# Patient Record
Sex: MALE | Race: WHITE | NOT HISPANIC OR LATINO | Employment: FULL TIME | URBAN - METROPOLITAN AREA
[De-identification: names, ages, dates, MRNs, and addresses within clinical notes are randomized per-mention and may not be internally consistent; named-entity substitution may affect disease eponyms.]

---

## 2023-01-27 PROBLEM — M54.50 LUMBAR PAIN: Status: ACTIVE | Noted: 2023-01-27

## 2023-01-27 RX ORDER — MOMETASONE FUROATE 50 UG/1
SPRAY, METERED NASAL
COMMUNITY
Start: 2016-02-03 | End: 2023-03-09 | Stop reason: WASHOUT

## 2023-01-27 RX ORDER — ATORVASTATIN CALCIUM 10 MG/1
10 TABLET, FILM COATED ORAL
COMMUNITY
Start: 2016-02-03 | End: 2023-03-09 | Stop reason: WASHOUT

## 2023-01-27 RX ORDER — FEXOFENADINE HCL 60 MG
60 TABLET ORAL
COMMUNITY
Start: 2016-02-03 | End: 2023-03-09 | Stop reason: WASHOUT

## 2023-01-27 RX ORDER — CITALOPRAM 10 MG/1
10 TABLET ORAL
COMMUNITY
Start: 2016-02-03 | End: 2023-03-09 | Stop reason: WASHOUT

## 2023-01-27 RX ORDER — OMEPRAZOLE 20 MG/1
CAPSULE, DELAYED RELEASE ORAL
COMMUNITY
Start: 2016-02-03 | End: 2023-03-09 | Stop reason: WASHOUT

## 2023-01-27 RX ORDER — MOMETASONE FUROATE AND FORMOTEROL FUMARATE DIHYDRATE 100; 5 UG/1; UG/1
AEROSOL RESPIRATORY (INHALATION)
COMMUNITY
Start: 2016-02-03 | End: 2023-03-09 | Stop reason: SDUPTHER

## 2023-01-27 RX ORDER — ALBUTEROL SULFATE 0.83 MG/ML
2.5 SOLUTION RESPIRATORY (INHALATION)
COMMUNITY
Start: 2016-02-03 | End: 2023-09-12 | Stop reason: WASHOUT

## 2023-02-11 PROBLEM — E78.00 PURE HYPERCHOLESTEROLEMIA: Status: ACTIVE | Noted: 2019-06-17

## 2023-02-11 PROBLEM — I25.10 ATHEROSCLEROSIS OF CORONARY ARTERY WITHOUT ANGINA PECTORIS: Status: ACTIVE | Noted: 2022-09-15

## 2023-02-11 PROBLEM — F52.21 PSYCHOGENIC IMPOTENCE: Status: ACTIVE | Noted: 2021-01-18

## 2023-02-11 PROBLEM — B00.1 HERPESVIRAL VESICULAR DERMATITIS: Status: ACTIVE | Noted: 2019-06-17

## 2023-02-11 PROBLEM — J30.9 ALLERGIC RHINITIS: Status: ACTIVE | Noted: 2019-06-17

## 2023-02-11 PROBLEM — G47.00 INSOMNIA: Status: ACTIVE | Noted: 2021-01-18

## 2023-02-11 PROBLEM — J45.30 MILD PERSISTENT ASTHMA (HHS-HCC): Status: ACTIVE | Noted: 2019-06-17

## 2023-02-11 PROBLEM — N52.9 ERECTILE DYSFUNCTION: Status: ACTIVE | Noted: 2021-10-05

## 2023-02-11 PROBLEM — K76.9 CHRONIC NONALCOHOLIC LIVER DISEASE: Status: ACTIVE | Noted: 2022-09-15

## 2023-02-11 RX ORDER — DOXYCYCLINE HYCLATE 100 MG
1 TABLET ORAL 2 TIMES DAILY
COMMUNITY
End: 2023-03-09 | Stop reason: WASHOUT

## 2023-02-11 RX ORDER — EZETIMIBE 10 MG/1
1 TABLET ORAL DAILY
COMMUNITY
Start: 2022-05-05

## 2023-02-11 RX ORDER — CYCLOBENZAPRINE HCL 5 MG
1 TABLET ORAL NIGHTLY PRN
COMMUNITY
Start: 2021-01-18 | End: 2023-04-27 | Stop reason: SDUPTHER

## 2023-02-11 RX ORDER — TRAZODONE HYDROCHLORIDE 100 MG/1
1 TABLET ORAL NIGHTLY PRN
COMMUNITY
Start: 2020-11-09 | End: 2023-03-09 | Stop reason: SDUPTHER

## 2023-02-11 RX ORDER — SILDENAFIL 100 MG/1
.5-1 TABLET, FILM COATED ORAL
COMMUNITY
Start: 2021-01-18 | End: 2023-03-09 | Stop reason: SDUPTHER

## 2023-02-12 RX ORDER — OMEPRAZOLE 40 MG/1
1 CAPSULE, DELAYED RELEASE ORAL DAILY
COMMUNITY
Start: 2013-08-26 | End: 2023-03-09 | Stop reason: SDUPTHER

## 2023-02-12 RX ORDER — ROSUVASTATIN CALCIUM 40 MG/1
1 TABLET, COATED ORAL NIGHTLY
COMMUNITY
Start: 2017-12-20 | End: 2023-04-27 | Stop reason: SDUPTHER

## 2023-02-12 RX ORDER — ASPIRIN 81 MG/1
1 TABLET ORAL DAILY
COMMUNITY

## 2023-02-12 RX ORDER — NYSTATIN AND TRIAMCINOLONE ACETONIDE 100000; 1 [USP'U]/G; MG/G
CREAM TOPICAL 2 TIMES DAILY
COMMUNITY
Start: 2018-12-19 | End: 2023-04-27 | Stop reason: SDUPTHER

## 2023-02-12 RX ORDER — CELECOXIB 200 MG/1
1-2 CAPSULE ORAL DAILY
COMMUNITY
Start: 2015-10-22 | End: 2023-03-09 | Stop reason: SDUPTHER

## 2023-02-12 RX ORDER — MINERAL OIL
1 ENEMA (ML) RECTAL DAILY
COMMUNITY
Start: 2017-12-20 | End: 2023-03-09 | Stop reason: WASHOUT

## 2023-02-12 RX ORDER — VALACYCLOVIR HYDROCHLORIDE 1 G/1
1 TABLET, FILM COATED ORAL 2 TIMES DAILY
COMMUNITY
Start: 2016-06-06 | End: 2023-05-22 | Stop reason: SDUPTHER

## 2023-02-12 RX ORDER — FLUTICASONE PROPIONATE 50 MCG
SPRAY, SUSPENSION (ML) NASAL NIGHTLY
COMMUNITY
End: 2023-03-09 | Stop reason: SDUPTHER

## 2023-02-12 RX ORDER — FLUOROMETHOLONE 1 MG/ML
1 SUSPENSION/ DROPS OPHTHALMIC 3 TIMES DAILY
COMMUNITY
Start: 2018-06-27

## 2023-03-09 ENCOUNTER — OFFICE VISIT (OUTPATIENT)
Dept: PRIMARY CARE | Facility: CLINIC | Age: 68
End: 2023-03-09
Payer: COMMERCIAL

## 2023-03-09 VITALS
TEMPERATURE: 98.7 F | DIASTOLIC BLOOD PRESSURE: 74 MMHG | HEART RATE: 73 BPM | WEIGHT: 258 LBS | OXYGEN SATURATION: 96 % | BODY MASS INDEX: 32.08 KG/M2 | SYSTOLIC BLOOD PRESSURE: 114 MMHG | HEIGHT: 75 IN

## 2023-03-09 DIAGNOSIS — B00.1 HERPESVIRAL VESICULAR DERMATITIS: ICD-10-CM

## 2023-03-09 DIAGNOSIS — K21.9 GASTROESOPHAGEAL REFLUX DISEASE WITHOUT ESOPHAGITIS: ICD-10-CM

## 2023-03-09 DIAGNOSIS — Z12.5 SCREENING FOR MALIGNANT NEOPLASM OF PROSTATE: Primary | ICD-10-CM

## 2023-03-09 DIAGNOSIS — M15.0 PRIMARY GENERALIZED (OSTEO)ARTHRITIS: ICD-10-CM

## 2023-03-09 DIAGNOSIS — K76.0 FATTY LIVER: ICD-10-CM

## 2023-03-09 DIAGNOSIS — J45.30 MILD PERSISTENT ASTHMA WITHOUT COMPLICATION (HHS-HCC): ICD-10-CM

## 2023-03-09 DIAGNOSIS — I25.10 ATHEROSCLEROSIS OF NATIVE CORONARY ARTERY OF NATIVE HEART WITHOUT ANGINA PECTORIS: ICD-10-CM

## 2023-03-09 DIAGNOSIS — G47.00 INSOMNIA, UNSPECIFIED TYPE: ICD-10-CM

## 2023-03-09 DIAGNOSIS — J30.2 SEASONAL ALLERGIC RHINITIS, UNSPECIFIED TRIGGER: ICD-10-CM

## 2023-03-09 DIAGNOSIS — N52.9 ERECTILE DYSFUNCTION, UNSPECIFIED ERECTILE DYSFUNCTION TYPE: ICD-10-CM

## 2023-03-09 DIAGNOSIS — E78.00 PURE HYPERCHOLESTEROLEMIA: ICD-10-CM

## 2023-03-09 PROCEDURE — 1036F TOBACCO NON-USER: CPT | Performed by: PHYSICIAN ASSISTANT

## 2023-03-09 PROCEDURE — 99214 OFFICE O/P EST MOD 30 MIN: CPT | Performed by: PHYSICIAN ASSISTANT

## 2023-03-09 PROCEDURE — 3008F BODY MASS INDEX DOCD: CPT | Performed by: PHYSICIAN ASSISTANT

## 2023-03-09 PROCEDURE — 1160F RVW MEDS BY RX/DR IN RCRD: CPT | Performed by: PHYSICIAN ASSISTANT

## 2023-03-09 PROCEDURE — 1159F MED LIST DOCD IN RCRD: CPT | Performed by: PHYSICIAN ASSISTANT

## 2023-03-09 RX ORDER — MOMETASONE FUROATE AND FORMOTEROL FUMARATE DIHYDRATE 100; 5 UG/1; UG/1
2 AEROSOL RESPIRATORY (INHALATION)
Qty: 39 G | Refills: 1 | Status: SHIPPED | OUTPATIENT
Start: 2023-03-09 | End: 2023-03-21 | Stop reason: CLARIF

## 2023-03-09 RX ORDER — SILDENAFIL 100 MG/1
50-100 TABLET, FILM COATED ORAL
Qty: 30 TABLET | Refills: 0 | Status: SHIPPED | OUTPATIENT
Start: 2023-03-09 | End: 2023-04-27 | Stop reason: SDUPTHER

## 2023-03-09 RX ORDER — OMEPRAZOLE 40 MG/1
40 CAPSULE, DELAYED RELEASE ORAL DAILY
Qty: 90 CAPSULE | Refills: 1 | Status: SHIPPED | OUTPATIENT
Start: 2023-03-09 | End: 2023-04-27 | Stop reason: SDUPTHER

## 2023-03-09 RX ORDER — FLUTICASONE PROPIONATE 50 MCG
2 SPRAY, SUSPENSION (ML) NASAL DAILY
Qty: 48 G | Refills: 1 | Status: SHIPPED | OUTPATIENT
Start: 2023-03-09 | End: 2023-04-27 | Stop reason: SDUPTHER

## 2023-03-09 RX ORDER — BUDESONIDE 0.5 MG/2ML
0.5 INHALANT ORAL
COMMUNITY
End: 2023-03-09 | Stop reason: WASHOUT

## 2023-03-09 RX ORDER — MUPIROCIN 20 MG/G
OINTMENT TOPICAL
COMMUNITY
End: 2023-09-12 | Stop reason: WASHOUT

## 2023-03-09 RX ORDER — CELECOXIB 200 MG/1
200-400 CAPSULE ORAL DAILY
Qty: 180 CAPSULE | Refills: 0 | Status: SHIPPED | OUTPATIENT
Start: 2023-03-09 | End: 2023-06-20 | Stop reason: SDUPTHER

## 2023-03-09 RX ORDER — CETIRIZINE HYDROCHLORIDE 10 MG/1
10 TABLET ORAL DAILY
COMMUNITY

## 2023-03-09 RX ORDER — TRAZODONE HYDROCHLORIDE 100 MG/1
100 TABLET ORAL NIGHTLY PRN
Qty: 90 TABLET | Refills: 1 | Status: SHIPPED | OUTPATIENT
Start: 2023-03-09 | End: 2023-04-27 | Stop reason: SDUPTHER

## 2023-03-09 RX ORDER — ALBUTEROL SULFATE 90 UG/1
2 AEROSOL, METERED RESPIRATORY (INHALATION) EVERY 4 HOURS PRN
Qty: 8 G | Refills: 1 | Status: SHIPPED | OUTPATIENT
Start: 2023-03-09 | End: 2023-04-27 | Stop reason: SDUPTHER

## 2023-03-09 NOTE — PROGRESS NOTES
Subjective   Donald Hubbard is a 67 y.o. male and is here for recheck.     HPI:   Presents for follow-up. Taking medication as prescribed. No medication side effects noted. Trying to follow recommended diet.     Hyperlipidemia: Taking Rosuvastatin 40 mg qd and Zetia. Didn't get labs for this visit. LDL good at 67 on 9/17/22 labs.     CAD: No chest pains. Sees cardiologist Dr العلي.  Had increased cardiac calcium score last year but had negative stress test.      ED: well controlled with sildenafil, which he tolerates well.      Cold sores: No cold sores since last visit. Rarely needs to use Valtrex -- works well.       GERD: Taking and tolerating omeprazole 40mg qd. No breakthrough reflux.     Asthma: well controlled with Dulera.  Uses Albuterol <1 x per week.      Allergies: Well controlled with Allegra and Flonase.        insomnia: Sleeping welll with trazodone -- 1/2 pill usually works well enough.      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  Takes celebrex 1-2 times per day and tolerates well.  Rarely uses cyclobenzaprine when back flares up.    Going to be have a knee replacement this year.   Cardiologist Dr العلي cleared him already for this. Doing exercise 3-4 days per week.     Has fatty liver. No abdominal pain.  LFTs WNL 9/17/22.     Got COVID vaccine 2/2021 and booster.       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      Review of Systems   Respiratory:  Negative for shortness of breath.    Cardiovascular:  Negative for chest pain and palpitations.   Gastrointestinal:  Negative for abdominal pain.        Objective   Physical Exam  HENT:      Head: Normocephalic.   Eyes:      General: No scleral icterus.  Cardiovascular:      Rate and Rhythm: Normal rate and regular rhythm.   Pulmonary:      Effort: Pulmonary effort is normal.      Breath sounds: Normal breath sounds.   Abdominal:      Palpations: Abdomen is soft. There is no mass.      Tenderness: There is no  abdominal tenderness.   Skin:     General: Skin is warm and dry.   Neurological:      Mental Status: He is alert.   Psychiatric:         Mood and Affect: Affect normal.         Assessment/Plan   1. Screening for malignant neoplasm of prostate    2. Pure hypercholesterolemia    3. Atherosclerosis of native coronary artery of native heart without angina pectoris    4. Gastroesophageal reflux disease without esophagitis    5. Mild persistent asthma without complication    6. Erectile dysfunction, unspecified erectile dysfunction type    7. Insomnia, unspecified type    8. Seasonal allergic rhinitis, unspecified trigger    9. Primary generalized (osteo)arthritis    10. Herpesviral vesicular dermatitis    11. Fatty liver          Plan      1. Reviewed previous labs  2. Refilled meds.    3. Discussed the patient's BMI with him.  The BMI is above average. The patient received Provided instructions on dietary changes  Advised to Increase physical activity because they have an above normal BMI.  4. Follow up in 6 months with fasting labs the week before.

## 2023-03-11 ASSESSMENT — ENCOUNTER SYMPTOMS
PALPITATIONS: 0
ABDOMINAL PAIN: 0
SHORTNESS OF BREATH: 0

## 2023-03-20 ENCOUNTER — NURSE TRIAGE (OUTPATIENT)
Dept: PRIMARY CARE | Facility: CLINIC | Age: 68
End: 2023-03-20
Payer: COMMERCIAL

## 2023-03-20 DIAGNOSIS — J45.30 MILD PERSISTENT ASTHMA, UNSPECIFIED WHETHER COMPLICATED (HHS-HCC): Primary | ICD-10-CM

## 2023-03-20 RX ORDER — FLUTICASONE FUROATE AND VILANTEROL 100; 25 UG/1; UG/1
1 POWDER RESPIRATORY (INHALATION) DAILY
Qty: 90 EACH | Refills: 1 | Status: SHIPPED | OUTPATIENT
Start: 2023-03-20 | End: 2023-03-21

## 2023-03-20 NOTE — TELEPHONE ENCOUNTER
Received fax from [employer] stating rx - Dulera requires a PA, if PA, please start under med rec, med that maybe covered per sheet:  - advair diskus  -breo ellipta  -symbicort    Change med or PA, please advise Thx [facility]

## 2023-03-21 DIAGNOSIS — J45.40 MODERATE PERSISTENT ASTHMA WITHOUT STATUS ASTHMATICUS WITHOUT COMPLICATION (HHS-HCC): Primary | ICD-10-CM

## 2023-03-21 RX ORDER — FLUTICASONE PROPIONATE AND SALMETEROL 250; 50 UG/1; UG/1
1 POWDER RESPIRATORY (INHALATION)
Qty: 180 EACH | Refills: 1 | Status: SHIPPED | OUTPATIENT
Start: 2023-03-21 | End: 2023-09-12 | Stop reason: WASHOUT

## 2023-03-21 NOTE — TELEPHONE ENCOUNTER
Called pt, he states he has tried Dulera in past and it did not work, can we send in Advair??? Thx [facility]

## 2023-04-27 DIAGNOSIS — R21 RASH: ICD-10-CM

## 2023-04-27 DIAGNOSIS — N52.9 ERECTILE DYSFUNCTION, UNSPECIFIED ERECTILE DYSFUNCTION TYPE: ICD-10-CM

## 2023-04-27 DIAGNOSIS — E78.00 PURE HYPERCHOLESTEROLEMIA: ICD-10-CM

## 2023-04-27 DIAGNOSIS — G47.00 INSOMNIA, UNSPECIFIED TYPE: ICD-10-CM

## 2023-04-27 DIAGNOSIS — M54.50 CHRONIC LOW BACK PAIN WITHOUT SCIATICA, UNSPECIFIED BACK PAIN LATERALITY: Primary | ICD-10-CM

## 2023-04-27 DIAGNOSIS — J45.30 MILD PERSISTENT ASTHMA WITHOUT COMPLICATION (HHS-HCC): ICD-10-CM

## 2023-04-27 DIAGNOSIS — G89.29 CHRONIC LOW BACK PAIN WITHOUT SCIATICA, UNSPECIFIED BACK PAIN LATERALITY: Primary | ICD-10-CM

## 2023-04-27 DIAGNOSIS — J30.2 SEASONAL ALLERGIC RHINITIS, UNSPECIFIED TRIGGER: ICD-10-CM

## 2023-04-27 DIAGNOSIS — K21.9 GASTROESOPHAGEAL REFLUX DISEASE WITHOUT ESOPHAGITIS: ICD-10-CM

## 2023-04-27 RX ORDER — FLUTICASONE PROPIONATE 50 MCG
2 SPRAY, SUSPENSION (ML) NASAL DAILY
Qty: 48 G | Refills: 1 | Status: SHIPPED | OUTPATIENT
Start: 2023-04-27

## 2023-04-27 RX ORDER — NYSTATIN AND TRIAMCINOLONE ACETONIDE 100000; 1 [USP'U]/G; MG/G
CREAM TOPICAL 2 TIMES DAILY
Qty: 30 G | Refills: 0 | Status: SHIPPED | OUTPATIENT
Start: 2023-04-27 | End: 2023-06-15

## 2023-04-27 RX ORDER — ROSUVASTATIN CALCIUM 40 MG/1
40 TABLET, COATED ORAL NIGHTLY
Qty: 90 TABLET | Refills: 1 | Status: SHIPPED | OUTPATIENT
Start: 2023-04-27 | End: 2023-05-16 | Stop reason: SDUPTHER

## 2023-04-27 RX ORDER — OMEPRAZOLE 40 MG/1
40 CAPSULE, DELAYED RELEASE ORAL DAILY
Qty: 90 CAPSULE | Refills: 1 | Status: SHIPPED | OUTPATIENT
Start: 2023-04-27 | End: 2024-03-12 | Stop reason: SDUPTHER

## 2023-04-27 RX ORDER — CYCLOBENZAPRINE HCL 5 MG
5 TABLET ORAL NIGHTLY PRN
Qty: 30 TABLET | Refills: 0 | Status: SHIPPED | OUTPATIENT
Start: 2023-04-27

## 2023-04-27 RX ORDER — TRAZODONE HYDROCHLORIDE 100 MG/1
100 TABLET ORAL NIGHTLY PRN
Qty: 90 TABLET | Refills: 1 | Status: SHIPPED | OUTPATIENT
Start: 2023-04-27 | End: 2023-06-15

## 2023-04-27 RX ORDER — SILDENAFIL 100 MG/1
50-100 TABLET, FILM COATED ORAL
Qty: 30 TABLET | Refills: 0 | Status: SHIPPED | OUTPATIENT
Start: 2023-04-27 | End: 2023-09-12 | Stop reason: SDUPTHER

## 2023-04-27 RX ORDER — ALBUTEROL SULFATE 90 UG/1
2 AEROSOL, METERED RESPIRATORY (INHALATION) EVERY 4 HOURS PRN
Qty: 8 G | Refills: 1 | Status: SHIPPED | OUTPATIENT
Start: 2023-04-27 | End: 2023-05-02 | Stop reason: CLARIF

## 2023-04-27 NOTE — TELEPHONE ENCOUNTER
Pt called Rx line @ 331 on 4/27 asking for refill on pended medications  Pt needs rosuvastatin to go to [facility] because he is out and all the rest to the mail pharmacy. Please advise, AM

## 2023-05-02 ENCOUNTER — TELEPHONE (OUTPATIENT)
Dept: PRIMARY CARE | Facility: CLINIC | Age: 68
End: 2023-05-02
Payer: COMMERCIAL

## 2023-05-02 DIAGNOSIS — J45.30 MILD PERSISTENT ASTHMA WITHOUT COMPLICATION (HHS-HCC): Primary | ICD-10-CM

## 2023-05-02 RX ORDER — ALBUTEROL SULFATE 90 UG/1
2 AEROSOL, METERED RESPIRATORY (INHALATION) EVERY 4 HOURS PRN
Qty: 6.7 G | Refills: 1 | Status: SHIPPED | OUTPATIENT
Start: 2023-05-02 | End: 2023-06-15

## 2023-05-02 NOTE — TELEPHONE ENCOUNTER
Epa for Ventolin was denied.    Pt MUST tried and failed:  Albuterol sulfate CFC-free aerosol  Levealbuterol tartrate CFC-free aerosol    Please advise

## 2023-05-16 DIAGNOSIS — E78.00 PURE HYPERCHOLESTEROLEMIA: ICD-10-CM

## 2023-05-16 RX ORDER — ROSUVASTATIN CALCIUM 40 MG/1
40 TABLET, COATED ORAL NIGHTLY
Qty: 90 TABLET | Refills: 1 | Status: SHIPPED | OUTPATIENT
Start: 2023-05-16 | End: 2023-06-15

## 2023-05-16 NOTE — TELEPHONE ENCOUNTER
Pt called rx line at 1041k requesting pended med to go to mail order [employer].    Last rx was sent via epic for 90d on 4/27  Next ov 9/11/23  Pt compliant

## 2023-05-17 DIAGNOSIS — K21.9 GASTROESOPHAGEAL REFLUX DISEASE WITHOUT ESOPHAGITIS: ICD-10-CM

## 2023-05-17 DIAGNOSIS — J30.2 SEASONAL ALLERGIC RHINITIS, UNSPECIFIED TRIGGER: ICD-10-CM

## 2023-05-17 RX ORDER — MINERAL OIL
ENEMA (ML) RECTAL
Qty: 90 TABLET | Refills: 0 | OUTPATIENT
Start: 2023-05-17

## 2023-05-17 RX ORDER — FLUTICASONE PROPIONATE 50 MCG
SPRAY, SUSPENSION (ML) NASAL
Qty: 47.4 ML | Refills: 0 | OUTPATIENT
Start: 2023-05-17

## 2023-05-17 RX ORDER — OMEPRAZOLE 40 MG/1
CAPSULE, DELAYED RELEASE ORAL
Qty: 90 CAPSULE | Refills: 0 | OUTPATIENT
Start: 2023-05-17

## 2023-05-22 DIAGNOSIS — B00.1 HERPESVIRAL VESICULAR DERMATITIS: Primary | ICD-10-CM

## 2023-05-22 RX ORDER — VALACYCLOVIR HYDROCHLORIDE 1 G/1
1000 TABLET, FILM COATED ORAL 2 TIMES DAILY
Qty: 60 TABLET | Refills: 1 | Status: SHIPPED | OUTPATIENT
Start: 2023-05-22 | End: 2024-03-12 | Stop reason: SDUPTHER

## 2023-06-15 DIAGNOSIS — R21 RASH: ICD-10-CM

## 2023-06-15 DIAGNOSIS — E78.00 PURE HYPERCHOLESTEROLEMIA: ICD-10-CM

## 2023-06-15 DIAGNOSIS — B00.1 HERPESVIRAL VESICULAR DERMATITIS: Primary | ICD-10-CM

## 2023-06-15 DIAGNOSIS — J45.30 MILD PERSISTENT ASTHMA WITHOUT COMPLICATION (HHS-HCC): ICD-10-CM

## 2023-06-15 DIAGNOSIS — G47.00 INSOMNIA, UNSPECIFIED TYPE: ICD-10-CM

## 2023-06-15 RX ORDER — VALACYCLOVIR HYDROCHLORIDE 500 MG/1
500 TABLET, FILM COATED ORAL DAILY
Qty: 90 TABLET | Refills: 1 | Status: SHIPPED | OUTPATIENT
Start: 2023-06-15 | End: 2023-09-12 | Stop reason: WASHOUT

## 2023-06-15 RX ORDER — TRAZODONE HYDROCHLORIDE 100 MG/1
100 TABLET ORAL NIGHTLY PRN
Qty: 90 TABLET | Refills: 1 | Status: SHIPPED | OUTPATIENT
Start: 2023-06-15 | End: 2023-09-12 | Stop reason: SDUPTHER

## 2023-06-15 RX ORDER — ROSUVASTATIN CALCIUM 40 MG/1
40 TABLET, COATED ORAL NIGHTLY
Qty: 90 TABLET | Refills: 1 | Status: SHIPPED | OUTPATIENT
Start: 2023-06-15 | End: 2023-09-06 | Stop reason: SDUPTHER

## 2023-06-15 RX ORDER — NYSTATIN AND TRIAMCINOLONE ACETONIDE 100000; 1 [USP'U]/G; MG/G
CREAM TOPICAL 2 TIMES DAILY
Qty: 60 G | Refills: 1 | Status: SHIPPED | OUTPATIENT
Start: 2023-06-15 | End: 2024-03-12 | Stop reason: SDUPTHER

## 2023-06-15 RX ORDER — ALBUTEROL SULFATE 90 UG/1
2 AEROSOL, METERED RESPIRATORY (INHALATION) EVERY 4 HOURS PRN
Qty: 18 G | Refills: 2 | Status: SHIPPED | OUTPATIENT
Start: 2023-06-15 | End: 2023-09-12 | Stop reason: SDUPTHER

## 2023-06-20 DIAGNOSIS — M15.0 PRIMARY GENERALIZED (OSTEO)ARTHRITIS: ICD-10-CM

## 2023-06-20 NOTE — TELEPHONE ENCOUNTER
Pt called rx line at 413p requesting pended med    Next ov 9/11 - [facility]  Pt compliant  Last rx sent 3/9- 90d

## 2023-06-21 RX ORDER — CELECOXIB 200 MG/1
200-400 CAPSULE ORAL DAILY
Qty: 180 CAPSULE | Refills: 0 | Status: SHIPPED | OUTPATIENT
Start: 2023-06-21 | End: 2023-08-04 | Stop reason: SDUPTHER

## 2023-06-29 DIAGNOSIS — M15.0 PRIMARY GENERALIZED (OSTEO)ARTHRITIS: ICD-10-CM

## 2023-07-05 RX ORDER — CELECOXIB 200 MG/1
200-400 CAPSULE ORAL DAILY
Qty: 180 CAPSULE | Refills: 0 | OUTPATIENT
Start: 2023-07-05

## 2023-08-04 DIAGNOSIS — M15.0 PRIMARY GENERALIZED (OSTEO)ARTHRITIS: ICD-10-CM

## 2023-08-04 RX ORDER — CELECOXIB 200 MG/1
200-400 CAPSULE ORAL DAILY
Qty: 180 CAPSULE | Refills: 0 | Status: SHIPPED | OUTPATIENT
Start: 2023-08-04 | End: 2023-09-12 | Stop reason: SDUPTHER

## 2023-09-06 DIAGNOSIS — E78.00 PURE HYPERCHOLESTEROLEMIA: ICD-10-CM

## 2023-09-06 RX ORDER — ROSUVASTATIN CALCIUM 40 MG/1
40 TABLET, COATED ORAL NIGHTLY
Qty: 90 TABLET | Refills: 0 | Status: SHIPPED | OUTPATIENT
Start: 2023-09-06 | End: 2023-09-12 | Stop reason: SDUPTHER

## 2023-09-11 ENCOUNTER — APPOINTMENT (OUTPATIENT)
Dept: PRIMARY CARE | Facility: CLINIC | Age: 68
End: 2023-09-11
Payer: COMMERCIAL

## 2023-09-12 ENCOUNTER — OFFICE VISIT (OUTPATIENT)
Dept: PRIMARY CARE | Facility: CLINIC | Age: 68
End: 2023-09-12
Payer: MEDICARE

## 2023-09-12 ENCOUNTER — LAB (OUTPATIENT)
Dept: LAB | Facility: LAB | Age: 68
End: 2023-09-12
Payer: MEDICARE

## 2023-09-12 VITALS
TEMPERATURE: 98.1 F | OXYGEN SATURATION: 93 % | SYSTOLIC BLOOD PRESSURE: 124 MMHG | DIASTOLIC BLOOD PRESSURE: 80 MMHG | WEIGHT: 263 LBS | BODY MASS INDEX: 32.87 KG/M2 | HEART RATE: 59 BPM

## 2023-09-12 DIAGNOSIS — K76.0 FATTY LIVER: ICD-10-CM

## 2023-09-12 DIAGNOSIS — E78.00 PURE HYPERCHOLESTEROLEMIA: Primary | ICD-10-CM

## 2023-09-12 DIAGNOSIS — E78.00 PURE HYPERCHOLESTEROLEMIA: ICD-10-CM

## 2023-09-12 DIAGNOSIS — M15.0 PRIMARY GENERALIZED (OSTEO)ARTHRITIS: ICD-10-CM

## 2023-09-12 DIAGNOSIS — N52.9 ERECTILE DYSFUNCTION, UNSPECIFIED ERECTILE DYSFUNCTION TYPE: ICD-10-CM

## 2023-09-12 DIAGNOSIS — I25.10 ATHEROSCLEROSIS OF NATIVE CORONARY ARTERY OF NATIVE HEART WITHOUT ANGINA PECTORIS: ICD-10-CM

## 2023-09-12 DIAGNOSIS — G47.00 INSOMNIA, UNSPECIFIED TYPE: ICD-10-CM

## 2023-09-12 DIAGNOSIS — J45.30 MILD PERSISTENT ASTHMA WITHOUT COMPLICATION (HHS-HCC): ICD-10-CM

## 2023-09-12 DIAGNOSIS — B00.1 HERPESVIRAL VESICULAR DERMATITIS: ICD-10-CM

## 2023-09-12 DIAGNOSIS — Z12.5 SCREENING FOR MALIGNANT NEOPLASM OF PROSTATE: ICD-10-CM

## 2023-09-12 DIAGNOSIS — J30.2 SEASONAL ALLERGIC RHINITIS, UNSPECIFIED TRIGGER: ICD-10-CM

## 2023-09-12 DIAGNOSIS — K21.9 GASTROESOPHAGEAL REFLUX DISEASE WITHOUT ESOPHAGITIS: ICD-10-CM

## 2023-09-12 LAB
ALANINE AMINOTRANSFERASE (SGPT) (U/L) IN SER/PLAS: 29 U/L (ref 10–52)
ALBUMIN (G/DL) IN SER/PLAS: 4.5 G/DL (ref 3.4–5)
ALKALINE PHOSPHATASE (U/L) IN SER/PLAS: 57 U/L (ref 33–136)
ANION GAP IN SER/PLAS: 10 MMOL/L (ref 10–20)
ASPARTATE AMINOTRANSFERASE (SGOT) (U/L) IN SER/PLAS: 26 U/L (ref 9–39)
BASOPHILS (10*3/UL) IN BLOOD BY AUTOMATED COUNT: 0.06 X10E9/L (ref 0–0.1)
BASOPHILS/100 LEUKOCYTES IN BLOOD BY AUTOMATED COUNT: 0.9 % (ref 0–2)
BILIRUBIN TOTAL (MG/DL) IN SER/PLAS: 1 MG/DL (ref 0–1.2)
CALCIUM (MG/DL) IN SER/PLAS: 9 MG/DL (ref 8.6–10.3)
CARBON DIOXIDE, TOTAL (MMOL/L) IN SER/PLAS: 26 MMOL/L (ref 21–32)
CHLORIDE (MMOL/L) IN SER/PLAS: 106 MMOL/L (ref 98–107)
CHOLESTEROL (MG/DL) IN SER/PLAS: 119 MG/DL (ref 0–199)
CHOLESTEROL IN HDL (MG/DL) IN SER/PLAS: 51.4 MG/DL
CHOLESTEROL/HDL RATIO: 2.3
CREATININE (MG/DL) IN SER/PLAS: 0.87 MG/DL (ref 0.5–1.3)
EOSINOPHILS (10*3/UL) IN BLOOD BY AUTOMATED COUNT: 0.39 X10E9/L (ref 0–0.7)
EOSINOPHILS/100 LEUKOCYTES IN BLOOD BY AUTOMATED COUNT: 6 % (ref 0–6)
ERYTHROCYTE DISTRIBUTION WIDTH (RATIO) BY AUTOMATED COUNT: 13.2 % (ref 11.5–14.5)
ERYTHROCYTE MEAN CORPUSCULAR HEMOGLOBIN CONCENTRATION (G/DL) BY AUTOMATED: 33.3 G/DL (ref 32–36)
ERYTHROCYTE MEAN CORPUSCULAR VOLUME (FL) BY AUTOMATED COUNT: 93 FL (ref 80–100)
ERYTHROCYTES (10*6/UL) IN BLOOD BY AUTOMATED COUNT: 5.15 X10E12/L (ref 4.5–5.9)
GFR MALE: >90 ML/MIN/1.73M2
GLUCOSE (MG/DL) IN SER/PLAS: 87 MG/DL (ref 74–99)
HEMATOCRIT (%) IN BLOOD BY AUTOMATED COUNT: 48.1 % (ref 41–52)
HEMOGLOBIN (G/DL) IN BLOOD: 16 G/DL (ref 13.5–17.5)
IMMATURE GRANULOCYTES/100 LEUKOCYTES IN BLOOD BY AUTOMATED COUNT: 0.3 % (ref 0–0.9)
LDL: 49 MG/DL (ref 0–99)
LEUKOCYTES (10*3/UL) IN BLOOD BY AUTOMATED COUNT: 6.5 X10E9/L (ref 4.4–11.3)
LYMPHOCYTES (10*3/UL) IN BLOOD BY AUTOMATED COUNT: 1.62 X10E9/L (ref 1.2–4.8)
LYMPHOCYTES/100 LEUKOCYTES IN BLOOD BY AUTOMATED COUNT: 25 % (ref 13–44)
MONOCYTES (10*3/UL) IN BLOOD BY AUTOMATED COUNT: 0.47 X10E9/L (ref 0.1–1)
MONOCYTES/100 LEUKOCYTES IN BLOOD BY AUTOMATED COUNT: 7.3 % (ref 2–10)
NEUTROPHILS (10*3/UL) IN BLOOD BY AUTOMATED COUNT: 3.92 X10E9/L (ref 1.2–7.7)
NEUTROPHILS/100 LEUKOCYTES IN BLOOD BY AUTOMATED COUNT: 60.5 % (ref 40–80)
PLATELETS (10*3/UL) IN BLOOD AUTOMATED COUNT: 204 X10E9/L (ref 150–450)
POTASSIUM (MMOL/L) IN SER/PLAS: 4.3 MMOL/L (ref 3.5–5.3)
PROSTATE SPECIFIC ANTIGEN,SCREEN: 0.55 NG/ML (ref 0–4)
PROTEIN TOTAL: 6.9 G/DL (ref 6.4–8.2)
SODIUM (MMOL/L) IN SER/PLAS: 138 MMOL/L (ref 136–145)
TRIGLYCERIDE (MG/DL) IN SER/PLAS: 94 MG/DL (ref 0–149)
UREA NITROGEN (MG/DL) IN SER/PLAS: 12 MG/DL (ref 6–23)
VLDL: 19 MG/DL (ref 0–40)

## 2023-09-12 PROCEDURE — 1159F MED LIST DOCD IN RCRD: CPT | Performed by: PHYSICIAN ASSISTANT

## 2023-09-12 PROCEDURE — 85025 COMPLETE CBC W/AUTO DIFF WBC: CPT

## 2023-09-12 PROCEDURE — 80061 LIPID PANEL: CPT

## 2023-09-12 PROCEDURE — 80053 COMPREHEN METABOLIC PANEL: CPT

## 2023-09-12 PROCEDURE — 1036F TOBACCO NON-USER: CPT | Performed by: PHYSICIAN ASSISTANT

## 2023-09-12 PROCEDURE — 99214 OFFICE O/P EST MOD 30 MIN: CPT | Performed by: PHYSICIAN ASSISTANT

## 2023-09-12 PROCEDURE — G0103 PSA SCREENING: HCPCS

## 2023-09-12 PROCEDURE — 3008F BODY MASS INDEX DOCD: CPT | Performed by: PHYSICIAN ASSISTANT

## 2023-09-12 PROCEDURE — 1160F RVW MEDS BY RX/DR IN RCRD: CPT | Performed by: PHYSICIAN ASSISTANT

## 2023-09-12 PROCEDURE — 36415 COLL VENOUS BLD VENIPUNCTURE: CPT

## 2023-09-12 RX ORDER — EZETIMIBE 10 MG/1
10 TABLET ORAL
Status: CANCELLED | OUTPATIENT
Start: 2023-09-12

## 2023-09-12 RX ORDER — SILDENAFIL 100 MG/1
50-100 TABLET, FILM COATED ORAL
Qty: 30 TABLET | Refills: 0 | Status: SHIPPED | OUTPATIENT
Start: 2023-09-12 | End: 2024-03-12 | Stop reason: SDUPTHER

## 2023-09-12 RX ORDER — CELECOXIB 200 MG/1
200-400 CAPSULE ORAL DAILY
Qty: 180 CAPSULE | Refills: 0 | Status: SHIPPED | OUTPATIENT
Start: 2023-09-12 | End: 2024-03-12 | Stop reason: SDUPTHER

## 2023-09-12 RX ORDER — TRAZODONE HYDROCHLORIDE 100 MG/1
100 TABLET ORAL NIGHTLY PRN
Qty: 90 TABLET | Refills: 1 | Status: SHIPPED | OUTPATIENT
Start: 2023-09-12 | End: 2024-03-12 | Stop reason: SDUPTHER

## 2023-09-12 RX ORDER — ROSUVASTATIN CALCIUM 40 MG/1
40 TABLET, COATED ORAL NIGHTLY
Qty: 90 TABLET | Refills: 1 | Status: SHIPPED | OUTPATIENT
Start: 2023-09-12 | End: 2024-03-12 | Stop reason: SDUPTHER

## 2023-09-12 RX ORDER — ALBUTEROL SULFATE 90 UG/1
2 AEROSOL, METERED RESPIRATORY (INHALATION) EVERY 4 HOURS PRN
Qty: 18 G | Refills: 2 | Status: SHIPPED | OUTPATIENT
Start: 2023-09-12 | End: 2024-03-12 | Stop reason: SDUPTHER

## 2023-09-12 ASSESSMENT — ENCOUNTER SYMPTOMS
ABDOMINAL PAIN: 0
SHORTNESS OF BREATH: 0
PALPITATIONS: 0

## 2023-09-12 NOTE — PROGRESS NOTES
Subjective   Donald Hubbard is a 68 y.o. male and is here for recheck.     HPI:   Presents for recheck of hyperlipidemia, CAD, ED, GERD, asthma, allergies, insomnia, DJD, and cold sores.       Hyperlipidemia: Taking Rosuvastatin 40 mg qd and Zetia. Didn't get labs for this visit. LDL good at 67 on 9/17/22 labs.   LDL (mg/dL)   Date Value   09/17/2022 67   03/28/2022 70     Triglycerides (mg/dL)   Date Value   09/17/2022 95   03/28/2022 200 (H)     HDL (mg/dL)   Date Value   09/17/2022 48.9   03/28/2022 45.2       CAD: No chest pains. Sees cardiologist Dr العلي.  Had appt there 5/5/23. Had increased cardiac calcium score last year but had negative stress test.      ED: well controlled with sildenafil, which he tolerates well.      Cold sores: No cold sores since last visit. Rarely needs to use Valtrex -- works well.       GERD: Taking and tolerating omeprazole 40mg qd. No breakthrough reflux.     Asthma: well controlled with Dulera.  Uses Albuterol <1 x per week.      Allergies: Well controlled with Allegra and Flonase.        insomnia: Sleeping welll with trazodone -- 1/2 pill usually works well enough.      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  Takes celebrex 1-2 times per day and tolerates well.  Rarely uses cyclobenzaprine when back flares up.    Had right knee replacement earlier this year.     Has fatty liver. No abdominal pain.  LFTs WNL 9/17/22.   ALT (SGPT) (U/L)   Date Value   09/17/2022 22   03/28/2022 21     AST (U/L)   Date Value   09/17/2022 21   03/28/2022 20         Saw dermatologist (Dr Buckner) yesterday (goes every 6 months).   Sees GI Dr Jose E Moss every 1-2 years.   Cardiologist Dr العلي.   Orthopedic for knee  Dr Austin (optometry)  Dr Marck Pederson (ophthalmology)    Colonoscopy done 2022 ([facility]) -- polyp. Repeat in 2025.     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      Review of Systems   Respiratory:  Negative for shortness of breath.     Cardiovascular:  Negative for chest pain and palpitations.   Gastrointestinal:  Negative for abdominal pain.      Objective   Physical Exam  HENT:      Head: Normocephalic.   Eyes:      General: No scleral icterus.  Cardiovascular:      Rate and Rhythm: Normal rate and regular rhythm.   Pulmonary:      Effort: Pulmonary effort is normal.      Breath sounds: Normal breath sounds.   Abdominal:      Palpations: Abdomen is soft. There is no mass.      Tenderness: There is no abdominal tenderness.   Skin:     General: Skin is warm and dry.   Neurological:      Mental Status: He is alert.   Psychiatric:         Mood and Affect: Affect normal.       Assessment/Plan   Donald was seen today for gerd, insomnia and hyperlipidemia.  Diagnoses and all orders for this visit:  Pure hypercholesterolemia (Primary)  -     Comprehensive Metabolic Panel; Future  -     Lipid Panel; Future  -     rosuvastatin (Crestor) 40 mg tablet; Take 1 tablet (40 mg) by mouth once daily at bedtime.  -     Lipid Panel; Future  -     Comprehensive Metabolic Panel; Future  -     CBC and Auto Differential; Future  Atherosclerosis of native coronary artery of native heart without angina pectoris  -     CBC and Auto Differential; Future  -     Lipid Panel; Future  -     Lipid Panel; Future  -     Comprehensive Metabolic Panel; Future  -     CBC and Auto Differential; Future  Gastroesophageal reflux disease without esophagitis  -     CBC and Auto Differential; Future  Mild persistent asthma without complication  -     albuterol 90 mcg/actuation inhaler; Inhale 2 puffs every 4 hours if needed for wheezing or shortness of breath.  -     mometasone-formoterol (Dulera 100) 100-5 mcg/actuation inhaler; Inhale 2 puffs 2 times a day. Rinse mouth with water after use to reduce aftertaste and incidence of candidiasis. Do not swallow.  Erectile dysfunction, unspecified erectile dysfunction type  -     sildenafil (Viagra) 100 mg tablet; Take 0.5-1 tablets (  mg) by mouth every other day if needed for erectile dysfunction.  Insomnia, unspecified type  -     traZODone (Desyrel) 100 mg tablet; Take 1 tablet (100 mg) by mouth as needed at bedtime for sleep.  -     CBC and Auto Differential; Future  Fatty liver  -     CBC and Auto Differential; Future  -     Comprehensive Metabolic Panel; Future  -     Comprehensive Metabolic Panel; Future  Seasonal allergic rhinitis, unspecified trigger  Primary generalized (osteo)arthritis  -     celecoxib (CeleBREX) 200 mg capsule; Take 1-2 capsules (200-400 mg) by mouth once daily.  Herpesviral vesicular dermatitis    Plan    Get fasting labs today.   Discussed diet and exercise, and encouraged weight loss.   Refilled meds.   Follow up in 6 months for recheck hyperlipidemia, CAD, GERD, asthma, insomnia, ED with fasting labs the week before.

## 2023-09-19 ENCOUNTER — TELEPHONE (OUTPATIENT)
Dept: PRIMARY CARE | Facility: CLINIC | Age: 68
End: 2023-09-19
Payer: COMMERCIAL

## 2024-01-13 ENCOUNTER — TELEMEDICINE (OUTPATIENT)
Dept: PRIMARY CARE | Facility: CLINIC | Age: 69
End: 2024-01-13
Payer: MEDICARE

## 2024-01-13 DIAGNOSIS — U07.1 COVID: Primary | ICD-10-CM

## 2024-01-13 PROCEDURE — 99213 OFFICE O/P EST LOW 20 MIN: CPT | Performed by: FAMILY MEDICINE

## 2024-01-13 RX ORDER — BUDESONIDE 0.5 MG/2ML
0.5 INHALANT ORAL 2 TIMES DAILY
COMMUNITY
Start: 2023-02-28 | End: 2024-01-13 | Stop reason: ALTCHOICE

## 2024-01-13 RX ORDER — HYDROCORTISONE 25 MG/G
OINTMENT TOPICAL
COMMUNITY
End: 2024-01-13 | Stop reason: ALTCHOICE

## 2024-01-13 RX ORDER — HYALURONATE SODIUM 10 MG/ML
20 VIAL (ML) INTRAARTICULAR
COMMUNITY
Start: 2022-07-08

## 2024-01-13 RX ORDER — FEXOFENADINE HCL 60 MG
TABLET ORAL
COMMUNITY

## 2024-01-13 NOTE — PROGRESS NOTES
Chief Complaint: URI sxs  HPI: wife is COVID +   Pt tested + yesterday and today  Went to [facility] this am but NP told her only a doctor can prescribe paxlovid (?)    Fever--no  Chills--no  Aches--no  Cough--not much--  Exhaustion--no  Sob or wheeze--no  Chest tightness--no  Sore throat--no  Congestion--yes  RN/stuffy nose/PND--yes RN  Headache--no  Nausea--no  Vomiting--no  Diarrhea--no  Appetite--good  Fluids--good  Exposures--wife    VAXXED ? Yes  Prior hx of COVID infection? Yes    ALL OTHER ROS (-)    General appearance:  Vitals available from patient?No  Alert, oriented, pleasant, in no apparent distress Yes  Answers questions appropriatelyYes  Eyes clear?Yes  Is patient in respiratory distressNo    Throat exam Not Available  Is patient coughing during consult:No  Audible wheezing noted? :No  Pt sounds congested?: Yes  Sniffing or rhinorrhea?: No  Psychiatric: Affect normalYes  Other relevant physical exam:      Pt location in [place] and consent obtained. Telemedicine appropriate evaluation completed. Appropriate physical exam done.      Sinusitis  Antibiotic as written    COVID +  Paxlovid discussed--how to take and how it works and adverse effects--paxlovid prescribed? Yes  CDC isolation guidelines discussed---first day of symptoms is day #0--- days 0-day #5 ISOLATE at home alone or with other COVID + individuals-- Days #6-10 if you go out, wear a mask, either an N95 or a KN95 as you are likely still contagious.  Avoid immunocompromised, elderly, pregnant women, infants etc  Low threshold for in person evaluation   -->Stop crestor and trazadone and viagra while taking paxlovid

## 2024-01-13 NOTE — PATIENT INSTRUCTIONS
Please send me a SeatSwap[employer] message if you have any questions or concerns.  FOR NON URGENT questions only.  Allow up to 72 hours for response.    If you have prescription issues or other questions you can email   Irasema Spring,  Digital Health Coordinator, at   [email or web address]     COVID +  Paxlovid discussed--how to take and how it works and adverse effects--paxlovid prescribed? Yes  CDC isolation guidelines discussed---first day of symptoms is day #0--- days 0-day #5 ISOLATE at home alone or with other COVID + individuals-- Days #6-10 if you go out, wear a mask, either an N95 or a KN95 as you are likely still contagious.  Avoid immunocompromised, elderly, pregnant women, infants etc  Low threshold for in person evaluation   -->Stop crestor and trazadone and viagra while taking paxlovid    Rest and drink plenty of fluids    Tylenol and or motrin as needed for pain and fever (unless you have been told not to take these because of your personal medical history)    Discussed options and precautions:   Viral versus bacterial infection; use of medications; possible side effects; appropriate over-the-counter medications; possible complications and /or when to follow-up.    Follow-up in 1 to 2 days if not improving.  Follow-up immediately if symptoms worsen.    All red flags requiring in person care were discussed.  All patient's questions were answered.    Limitations to telemedicine include inability to do a complete and accurate physical exam.  Any concerns regarding this were conveyed with the patient and in person follow-up recommended if patient nature of illness does not progress as anticipated during this visit.    Over-the-counter cold and cough medications    Take Mucinex for cough, drink plenty of fluids with this medication and will help break up congestion making it easier to cough up    For cough can use honey (children ages 1 and up) in hot tea or hot water. I recommend putting this in an  insulated cup and carrying it around throughout the day to sip on.  Have it at your bedside at night in case you wake up coughing.  You can also use honey cough drops (adults and older children).    Recommend nasal saline for use in children and adults.  Neti Bullock can also be helpful.  (Never used tap water and a Neti pot.  Use distilled water.)    If you have plugged up congested ears or the feeling of fluid in your ears, you can use an over-the-counter nasal steroid spray like fluticasone (brand name Flonase) use 2 sprays into each nostril once or twice a day for 7 days.  This will help open up the eustachian tubes and allow the fluid to drain out of your ears.    Sleeping with your head/chest elevated can help with sinus drainage.    Adults only-can use nasal decongestant (like Afrin) at bedtime to open nasal passages so you can breathe through your nose while you sleep; avoid using for longer than 3 days as this medication can become addicting.  Do not use if you have high blood pressure or high heart rate.    For severe pain or fever in adult-Tylenol (2 extra strength) or ibuprofen (3-4 tabs equals 600 to 800 mg) alternating as needed for pain.  Tylenol doses should be 6 to 8 hours apart and ibuprofen doses should be 6 to 8 hours apart.      Common cold medications for adults explained:    **Cold medications for children are not recommended-only Tylenol, Motrin, honey (only for children older than 1 year), cool-mist humidifier, and nasal saline spray are recommended for children.    Mucinex-(generic name guaifenesin)-is an expectorant.  This will thin out all the thick mucus.  Must drink plenty of liquids for this medicine to work.    Dextromethorphan (brand name Delsym or DM)-this medicine is a cough suppressant    Honey in hot water or tea-this is a natural cough suppressant    Decongestant nasal spray- (eg: Afrin) use for temporary relief of nasal congestion-best when used at bedtime to open up nasal  passages so that you are not forced to mouth breathe overnight.    Sudafed (generic name pseudoephedrine-this must be bought from the pharmacist) DO NOT use this medicine if you have high blood pressure as it can raise your blood pressure higher.  Do not use if you have any irregular heart rate.  This medicine can help clear congestion in your sinuses.

## 2024-02-19 DIAGNOSIS — J45.30 MILD PERSISTENT ASTHMA WITHOUT COMPLICATION (HHS-HCC): ICD-10-CM

## 2024-02-19 DIAGNOSIS — G47.00 INSOMNIA, UNSPECIFIED TYPE: ICD-10-CM

## 2024-02-20 RX ORDER — TRAZODONE HYDROCHLORIDE 100 MG/1
100 TABLET ORAL NIGHTLY PRN
Qty: 90 TABLET | Refills: 3 | OUTPATIENT
Start: 2024-02-20

## 2024-02-20 RX ORDER — ALBUTEROL SULFATE 90 UG/1
AEROSOL, METERED RESPIRATORY (INHALATION)
Qty: 17 G | Refills: 4 | OUTPATIENT
Start: 2024-02-20

## 2024-03-12 ENCOUNTER — LAB (OUTPATIENT)
Dept: LAB | Facility: LAB | Age: 69
End: 2024-03-12
Payer: MEDICARE

## 2024-03-12 ENCOUNTER — OFFICE VISIT (OUTPATIENT)
Dept: PRIMARY CARE | Facility: CLINIC | Age: 69
End: 2024-03-12
Payer: MEDICARE

## 2024-03-12 VITALS
SYSTOLIC BLOOD PRESSURE: 104 MMHG | DIASTOLIC BLOOD PRESSURE: 64 MMHG | TEMPERATURE: 98.1 F | OXYGEN SATURATION: 97 % | HEART RATE: 74 BPM | WEIGHT: 260 LBS | BODY MASS INDEX: 32.5 KG/M2

## 2024-03-12 DIAGNOSIS — K76.0 FATTY LIVER: ICD-10-CM

## 2024-03-12 DIAGNOSIS — B00.1 HERPESVIRAL VESICULAR DERMATITIS: ICD-10-CM

## 2024-03-12 DIAGNOSIS — M54.50 CHRONIC LOW BACK PAIN WITHOUT SCIATICA, UNSPECIFIED BACK PAIN LATERALITY: ICD-10-CM

## 2024-03-12 DIAGNOSIS — M15.0 PRIMARY GENERALIZED (OSTEO)ARTHRITIS: ICD-10-CM

## 2024-03-12 DIAGNOSIS — G89.29 CHRONIC LOW BACK PAIN WITHOUT SCIATICA, UNSPECIFIED BACK PAIN LATERALITY: ICD-10-CM

## 2024-03-12 DIAGNOSIS — J45.30 MILD PERSISTENT ASTHMA WITHOUT COMPLICATION (HHS-HCC): ICD-10-CM

## 2024-03-12 DIAGNOSIS — G47.00 INSOMNIA, UNSPECIFIED TYPE: ICD-10-CM

## 2024-03-12 DIAGNOSIS — R21 RASH: ICD-10-CM

## 2024-03-12 DIAGNOSIS — I25.10 ATHEROSCLEROSIS OF NATIVE CORONARY ARTERY OF NATIVE HEART WITHOUT ANGINA PECTORIS: ICD-10-CM

## 2024-03-12 DIAGNOSIS — Z00.00 ROUTINE GENERAL MEDICAL EXAMINATION AT HEALTH CARE FACILITY: ICD-10-CM

## 2024-03-12 DIAGNOSIS — K21.9 GASTROESOPHAGEAL REFLUX DISEASE WITHOUT ESOPHAGITIS: ICD-10-CM

## 2024-03-12 DIAGNOSIS — E78.00 PURE HYPERCHOLESTEROLEMIA: ICD-10-CM

## 2024-03-12 DIAGNOSIS — J30.2 SEASONAL ALLERGIC RHINITIS, UNSPECIFIED TRIGGER: ICD-10-CM

## 2024-03-12 DIAGNOSIS — N52.9 ERECTILE DYSFUNCTION, UNSPECIFIED ERECTILE DYSFUNCTION TYPE: ICD-10-CM

## 2024-03-12 DIAGNOSIS — E78.00 PURE HYPERCHOLESTEROLEMIA: Primary | ICD-10-CM

## 2024-03-12 LAB
ALBUMIN SERPL BCP-MCNC: 4.6 G/DL (ref 3.4–5)
ALP SERPL-CCNC: 53 U/L (ref 33–136)
ALT SERPL W P-5'-P-CCNC: 26 U/L (ref 10–52)
ANION GAP SERPL CALC-SCNC: 11 MMOL/L (ref 10–20)
AST SERPL W P-5'-P-CCNC: 24 U/L (ref 9–39)
BASOPHILS # BLD AUTO: 0.06 X10*3/UL (ref 0–0.1)
BASOPHILS NFR BLD AUTO: 1.1 %
BILIRUB SERPL-MCNC: 1.1 MG/DL (ref 0–1.2)
BUN SERPL-MCNC: 16 MG/DL (ref 6–23)
CALCIUM SERPL-MCNC: 9 MG/DL (ref 8.6–10.3)
CHLORIDE SERPL-SCNC: 105 MMOL/L (ref 98–107)
CHOLEST SERPL-MCNC: 126 MG/DL (ref 0–199)
CHOLESTEROL/HDL RATIO: 2.3
CO2 SERPL-SCNC: 28 MMOL/L (ref 21–32)
CREAT SERPL-MCNC: 0.89 MG/DL (ref 0.5–1.3)
EGFRCR SERPLBLD CKD-EPI 2021: >90 ML/MIN/1.73M*2
EOSINOPHIL # BLD AUTO: 0.46 X10*3/UL (ref 0–0.7)
EOSINOPHIL NFR BLD AUTO: 8.2 %
ERYTHROCYTE [DISTWIDTH] IN BLOOD BY AUTOMATED COUNT: 13.5 % (ref 11.5–14.5)
GLUCOSE SERPL-MCNC: 90 MG/DL (ref 74–99)
HCT VFR BLD AUTO: 50.3 % (ref 41–52)
HDLC SERPL-MCNC: 54.2 MG/DL
HGB BLD-MCNC: 16.9 G/DL (ref 13.5–17.5)
IMM GRANULOCYTES # BLD AUTO: 0.02 X10*3/UL (ref 0–0.7)
IMM GRANULOCYTES NFR BLD AUTO: 0.4 % (ref 0–0.9)
LDLC SERPL CALC-MCNC: 51 MG/DL
LYMPHOCYTES # BLD AUTO: 1.72 X10*3/UL (ref 1.2–4.8)
LYMPHOCYTES NFR BLD AUTO: 30.7 %
MCH RBC QN AUTO: 31.9 PG (ref 26–34)
MCHC RBC AUTO-ENTMCNC: 33.6 G/DL (ref 32–36)
MCV RBC AUTO: 95 FL (ref 80–100)
MONOCYTES # BLD AUTO: 0.4 X10*3/UL (ref 0.1–1)
MONOCYTES NFR BLD AUTO: 7.1 %
NEUTROPHILS # BLD AUTO: 2.94 X10*3/UL (ref 1.2–7.7)
NEUTROPHILS NFR BLD AUTO: 52.5 %
NON HDL CHOLESTEROL: 72 MG/DL (ref 0–149)
NRBC BLD-RTO: 0 /100 WBCS (ref 0–0)
PLATELET # BLD AUTO: 224 X10*3/UL (ref 150–450)
POTASSIUM SERPL-SCNC: 4.6 MMOL/L (ref 3.5–5.3)
PROT SERPL-MCNC: 6.9 G/DL (ref 6.4–8.2)
RBC # BLD AUTO: 5.3 X10*6/UL (ref 4.5–5.9)
SODIUM SERPL-SCNC: 139 MMOL/L (ref 136–145)
TRIGL SERPL-MCNC: 102 MG/DL (ref 0–149)
VLDL: 20 MG/DL (ref 0–40)
WBC # BLD AUTO: 5.6 X10*3/UL (ref 4.4–11.3)

## 2024-03-12 PROCEDURE — 36415 COLL VENOUS BLD VENIPUNCTURE: CPT

## 2024-03-12 PROCEDURE — 80053 COMPREHEN METABOLIC PANEL: CPT

## 2024-03-12 PROCEDURE — G0439 PPPS, SUBSEQ VISIT: HCPCS | Performed by: PHYSICIAN ASSISTANT

## 2024-03-12 PROCEDURE — 1036F TOBACCO NON-USER: CPT | Performed by: PHYSICIAN ASSISTANT

## 2024-03-12 PROCEDURE — 1170F FXNL STATUS ASSESSED: CPT | Performed by: PHYSICIAN ASSISTANT

## 2024-03-12 PROCEDURE — 1160F RVW MEDS BY RX/DR IN RCRD: CPT | Performed by: PHYSICIAN ASSISTANT

## 2024-03-12 PROCEDURE — 1123F ACP DISCUSS/DSCN MKR DOCD: CPT | Performed by: PHYSICIAN ASSISTANT

## 2024-03-12 PROCEDURE — 99214 OFFICE O/P EST MOD 30 MIN: CPT | Performed by: PHYSICIAN ASSISTANT

## 2024-03-12 PROCEDURE — 85025 COMPLETE CBC W/AUTO DIFF WBC: CPT

## 2024-03-12 PROCEDURE — 80061 LIPID PANEL: CPT

## 2024-03-12 PROCEDURE — 1159F MED LIST DOCD IN RCRD: CPT | Performed by: PHYSICIAN ASSISTANT

## 2024-03-12 PROCEDURE — 1158F ADVNC CARE PLAN TLK DOCD: CPT | Performed by: PHYSICIAN ASSISTANT

## 2024-03-12 RX ORDER — CELECOXIB 200 MG/1
200-400 CAPSULE ORAL DAILY
Qty: 180 CAPSULE | Refills: 0 | Status: SHIPPED | OUTPATIENT
Start: 2024-03-12

## 2024-03-12 RX ORDER — ALBUTEROL SULFATE 90 UG/1
2 AEROSOL, METERED RESPIRATORY (INHALATION) EVERY 4 HOURS PRN
Qty: 18 G | Refills: 2 | Status: SHIPPED | OUTPATIENT
Start: 2024-03-12 | End: 2025-03-12

## 2024-03-12 RX ORDER — NYSTATIN AND TRIAMCINOLONE ACETONIDE 100000; 1 [USP'U]/G; MG/G
CREAM TOPICAL 2 TIMES DAILY
Qty: 60 G | Refills: 1 | Status: SHIPPED | OUTPATIENT
Start: 2024-03-12

## 2024-03-12 RX ORDER — VALACYCLOVIR HYDROCHLORIDE 1 G/1
1000 TABLET, FILM COATED ORAL 2 TIMES DAILY
Qty: 60 TABLET | Refills: 1 | Status: SHIPPED | OUTPATIENT
Start: 2024-03-12

## 2024-03-12 RX ORDER — TRAZODONE HYDROCHLORIDE 100 MG/1
100 TABLET ORAL NIGHTLY PRN
Qty: 90 TABLET | Refills: 1 | Status: SHIPPED | OUTPATIENT
Start: 2024-03-12

## 2024-03-12 RX ORDER — ROSUVASTATIN CALCIUM 40 MG/1
40 TABLET, COATED ORAL NIGHTLY
Qty: 90 TABLET | Refills: 1 | Status: SHIPPED | OUTPATIENT
Start: 2024-03-12

## 2024-03-12 RX ORDER — FLUTICASONE PROPIONATE 50 MCG
2 SPRAY, SUSPENSION (ML) NASAL DAILY
Qty: 48 G | Refills: 1 | Status: CANCELLED | OUTPATIENT
Start: 2024-03-12

## 2024-03-12 RX ORDER — SILDENAFIL 100 MG/1
50-100 TABLET, FILM COATED ORAL
Qty: 30 TABLET | Refills: 0 | Status: SHIPPED | OUTPATIENT
Start: 2024-03-12

## 2024-03-12 RX ORDER — CYCLOBENZAPRINE HCL 5 MG
5 TABLET ORAL NIGHTLY PRN
Qty: 30 TABLET | Refills: 0 | Status: CANCELLED | OUTPATIENT
Start: 2024-03-12

## 2024-03-12 RX ORDER — OMEPRAZOLE 40 MG/1
40 CAPSULE, DELAYED RELEASE ORAL DAILY
Qty: 90 CAPSULE | Refills: 1 | Status: SHIPPED | OUTPATIENT
Start: 2024-03-12

## 2024-03-12 RX ORDER — AZELASTINE 1 MG/ML
1 SPRAY, METERED NASAL 2 TIMES DAILY
COMMUNITY

## 2024-03-12 RX ORDER — VARENICLINE 0.03 MG/.05ML
1 SPRAY NASAL EVERY 12 HOURS
COMMUNITY

## 2024-03-12 ASSESSMENT — ACTIVITIES OF DAILY LIVING (ADL)
TAKING_MEDICATION: INDEPENDENT
BATHING: INDEPENDENT
DOING_HOUSEWORK: INDEPENDENT
DRESSING: INDEPENDENT
MANAGING_FINANCES: INDEPENDENT
GROCERY_SHOPPING: INDEPENDENT

## 2024-03-12 ASSESSMENT — ENCOUNTER SYMPTOMS
SHORTNESS OF BREATH: 0
ABDOMINAL PAIN: 0
PALPITATIONS: 0

## 2024-03-12 ASSESSMENT — PATIENT HEALTH QUESTIONNAIRE - PHQ9
2. FEELING DOWN, DEPRESSED OR HOPELESS: NOT AT ALL
1. LITTLE INTEREST OR PLEASURE IN DOING THINGS: NOT AT ALL
SUM OF ALL RESPONSES TO PHQ9 QUESTIONS 1 AND 2: 0

## 2024-03-12 NOTE — PROGRESS NOTES
Subjective   Reason for Visit: Donald Hubbard is an 68 y.o. male here for a Medicare Wellness visit.     Past Medical, Surgical, and Family History reviewed and updated in chart.    Reviewed all medications by prescribing practitioner or clinical pharmacist (such as prescriptions, OTCs, herbal therapies and supplements) and documented in the medical record.    HPI      Annual Wellness visit.     Reviewed patient's answers to all Medicare screening questionnaire questions regarding falls, depression, general health status, nutrition and exercise, functional ability/level of safety, home safety risks, and ADLs/IADLs.     Healthcare POA and Living Will status: yes, his wife Samantha is POA.     Reviewed meds and discussed consistent use of meds as prescribed. Is not taking any high risk controlled/opioid medications.    No bowel or bladder incontinence.      Colonoscopy: 4/6/22 Dr [place] -- polyps. Repeat in 2025.       The patient has not felt down, felt depressed, felt hopeless, felt socially isolated or had little interest or pleasure in doing things over the past 2 weeks. No trouble with bathing, communicating, using the phone, dressing, eating, preparing meals, grooming, walking, toileting, house work, managing money, medications, shopping or transportation. Reports no falls.  The home does have grab bars in the bathroom. Has handrails on the stairs. The home does not have poor lighting or loose rugs.   Doesn't have hearing difficulty. No diet restrictions. Eats well balanced diet.  No recent hospitalizations.    Feels health is good.      Significant chronic pain (pain scale 2-3/10) -- arthritis.    Regularly exercises.     No cognitive impairment.     No DM or nephropathy.     Discussed getting yearly flu shots.         Saw dermatologist (Dr Buckner) yesterday (goes every 6 months).   Sees GI Dr Jose E Moss every 1-2 years.   Cardiologist Dr العلي.   Dr Yolanda Navarro (Ortho for knee)   Dr Jamie Kennedy  (ortho shoulder)  Dr Austin (optometry)  Dr Marck Pederson (ophthalmology)    Patient Care Team:  Rakesh Carson PA-C as PCP - General  Deshaun العلي as Cardiologist (Cardiology)  Jesus Navarro as Consulting Physician (Orthopaedic Surgery)     Review of Systems   Respiratory:  Negative for shortness of breath.    Cardiovascular:  Negative for chest pain and palpitations.   Gastrointestinal:  Negative for abdominal pain.       Objective   Vitals:  /64   Pulse 74   Temp 36.7 °C (98.1 °F)   Wt 118 kg (260 lb)   SpO2 97%   BMI 32.50 kg/m²       Physical Exam  Vitals and nursing note reviewed.   HENT:      Head: Normocephalic.   Eyes:      General: No scleral icterus.  Cardiovascular:      Rate and Rhythm: Normal rate and regular rhythm.   Pulmonary:      Effort: Pulmonary effort is normal.      Breath sounds: Normal breath sounds.   Abdominal:      Palpations: Abdomen is soft. There is no mass.      Tenderness: There is no abdominal tenderness.   Skin:     General: Skin is warm and dry.   Neurological:      Mental Status: He is alert.   Psychiatric:         Mood and Affect: Affect normal.         Assessment/Plan   Problem List Items Addressed This Visit       Gastroesophageal reflux disease    Overview     Note: [facility]         Relevant Medications    omeprazole (PriLOSEC) 40 mg DR capsule    Pure hypercholesterolemia - Primary    Relevant Medications    rosuvastatin (Crestor) 40 mg tablet    Other Relevant Orders    Lipid Panel    Comprehensive Metabolic Panel    Mild persistent asthma    Relevant Medications    albuterol 90 mcg/actuation inhaler    mometasone-formoterol (Dulera 100) 100-5 mcg/actuation inhaler    Allergic rhinitis    Herpesviral vesicular dermatitis    Relevant Medications    valACYclovir (Valtrex) 1 gram tablet    Insomnia    Relevant Medications    traZODone (Desyrel) 100 mg tablet    Erectile dysfunction    Overview     Due to psychophysiologic disorder         Relevant Medications     sildenafil (Viagra) 100 mg tablet    Atherosclerosis of coronary artery without angina pectoris    Relevant Medications    sildenafil (Viagra) 100 mg tablet    Other Relevant Orders    Lipid Panel    Comprehensive Metabolic Panel     Other Visit Diagnoses       Fatty liver        Primary generalized (osteo)arthritis        Relevant Medications    celecoxib (CeleBREX) 200 mg capsule    Chronic low back pain without sciatica, unspecified back pain laterality        Routine general medical examination at health care facility        Rash        Relevant Medications    nystatin-triamcinolone (Mycolog II) cream             Discussed wellness and safety issues.  Discussed diet and exercise and encouraged weight loss.  Discussed getting flu shot every fall as well as any recommended COVID boosters.  Will be due for colonoscopy next year.  Get fasting labs today.  Advised to get me a copy of his healthcare power of  living will documentation so I can scanned into his chart.

## 2024-03-12 NOTE — PROGRESS NOTES
Subjective   Donald Hubbard is a 68 y.o. male and is here for recheck.     HPI:   Presents for recheck of hyperlipidemia, CAD, ED, GERD, asthma, allergies, insomnia, DJD, and cold sores.  And AWE.     Hyperlipidemia: Taking Rosuvastatin 40 mg qd and Zetia. Didn't get labs for this visit. LDL good at 49 on 9/12/23 labs.  Doing bike most days.  Is fasting.   LDL (mg/dL)   Date Value   09/12/2023 49   09/17/2022 67     Triglycerides (mg/dL)   Date Value   03/12/2024 102   09/12/2023 94   09/17/2022 95     HDL-Cholesterol (mg/dL)   Date Value   03/12/2024 54.2     HDL (mg/dL)   Date Value   09/12/2023 51.4   09/17/2022 48.9       CAD: No chest pains. Sees cardiologist Dr العلي.  Had appt there 5/5/23 -- has follow up 5/29/24. Had increased cardiac calcium score in 2022 but had negative stress test.      ED: well controlled with sildenafil, which he tolerates well.      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       GERD: Taking and tolerating omeprazole 40mg qd. No breakthrough reflux or abdominal pain.     Asthma: well controlled. Taking and tolerating Dulera.  Uses Albuterol infrequently.       Allergies: Fairly well controlled with Allegra, Flonase, azelastine, and Pataday eye drops.        insomnia: Sleeping welll with trazodone -- 1/2 pill usually works adequately.      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  Rarely uses cyclobenzaprine when back flares up.    Requests refill of nystatin/triamcinolone cream that he uses rarely for rash he gets recurrently on his buttocks.    Has fatty liver. No abdominal pain.  LFTs WNL 9/12/23.   ALT (U/L)   Date Value   03/12/2024 26     ALT (SGPT) (U/L)   Date Value   09/12/2023 29   09/17/2022 22     AST (U/L)   Date Value   03/12/2024 24   09/12/2023/2023 26 09/17/2022 21         Saw dermatologist (Dr Buckner) yesterday (goes every 6 months).   Sees GI Dr Jose E Moss every 1-2 years.    Cardiologist Dr العلي.   Dr Yolanda Navarro (Ortho for knee)   Dr Jaime Kennedy (ortho shoulder)  Dr Austin (optometry)  Dr Marck Pederson (ophthalmology)    Colonoscopy done 2022 ([facility]) -- polyp. Repeat in 2025.     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     Going to [place] in 8/2024.       Review of Systems   Respiratory:  Negative for shortness of breath.    Cardiovascular:  Negative for chest pain and palpitations.   Gastrointestinal:  Negative for abdominal pain.      Objective   Physical Exam  HENT:      Head: Normocephalic.   Eyes:      General: No scleral icterus.  Cardiovascular:      Rate and Rhythm: Normal rate and regular rhythm.   Pulmonary:      Effort: Pulmonary effort is normal.      Breath sounds: Normal breath sounds.   Abdominal:      Palpations: Abdomen is soft. There is no mass.      Tenderness: There is no abdominal tenderness.   Skin:     General: Skin is warm and dry.   Neurological:      Mental Status: He is alert.   Psychiatric:         Mood and Affect: Affect normal.       Assessment/Plan   Donald was seen today for hyperlipidemia, gerd, insomnia and medicare annual wellness visit subsequent.  Diagnoses and all orders for this visit:  Pure hypercholesterolemia (Primary)  -     rosuvastatin (Crestor) 40 mg tablet; Take 1 tablet (40 mg) by mouth once daily at bedtime.  -     Lipid Panel; Future  -     Comprehensive Metabolic Panel; Future  Atherosclerosis of native coronary artery of native heart without angina pectoris  -     Lipid Panel; Future  -     Comprehensive Metabolic Panel; Future  Gastroesophageal reflux disease without esophagitis  -     omeprazole (PriLOSEC) 40 mg DR capsule; Take 1 capsule (40 mg) by mouth once daily.  Mild persistent asthma without complication  -     albuterol 90 mcg/actuation inhaler; Inhale 2 puffs every 4 hours if needed for wheezing or shortness of breath.  -     mometasone-formoterol (Dulera 100) 100-5 mcg/actuation inhaler;  Inhale 2 puffs 2 times a day. Rinse mouth with water after use to reduce aftertaste and incidence of candidiasis. Do not swallow.  Erectile dysfunction, unspecified erectile dysfunction type  -     sildenafil (Viagra) 100 mg tablet; Take 0.5-1 tablets ( mg) by mouth every other day if needed for erectile dysfunction.  Insomnia, unspecified type  -     traZODone (Desyrel) 100 mg tablet; Take 1 tablet (100 mg) by mouth as needed at bedtime for sleep.  Fatty liver  Seasonal allergic rhinitis, unspecified trigger  Primary generalized (osteo)arthritis  -     celecoxib (CeleBREX) 200 mg capsule; Take 1-2 capsules (200-400 mg) by mouth once daily.  Herpesviral vesicular dermatitis  -     valACYclovir (Valtrex) 1 gram tablet; Take 1 tablet (1,000 mg) by mouth 2 times a day.  Chronic low back pain without sciatica, unspecified back pain laterality  Routine general medical examination at health care facility  Rash  -     nystatin-triamcinolone (Mycolog II) cream; Apply topically 2 times a day. To affected area    Plan    Get fasting labs today.   Discussed diet and exercise, and encouraged weight loss.   Refilled meds.   Follow up in 6 months for recheck hyperlipidemia, CAD, GERD, asthma, insomnia, ED with fasting labs the week before.

## 2024-03-14 ENCOUNTER — TELEPHONE (OUTPATIENT)
Dept: PRIMARY CARE | Facility: CLINIC | Age: 69
End: 2024-03-14
Payer: MEDICARE

## 2024-03-14 DIAGNOSIS — J45.30 MILD PERSISTENT ASTHMA WITHOUT COMPLICATION (HHS-HCC): Primary | ICD-10-CM

## 2024-03-14 RX ORDER — FLUTICASONE PROPIONATE AND SALMETEROL XINAFOATE 115; 21 UG/1; UG/1
2 AEROSOL, METERED RESPIRATORY (INHALATION)
Qty: 36 G | Refills: 1 | Status: SHIPPED | OUTPATIENT
Start: 2024-03-14

## 2024-03-14 NOTE — TELEPHONE ENCOUNTER
Received a fax from [employer] stating they provided pt with a temporary supply of Dulera but it is not covered under his formulary. Alternative meds are Fluticasone-Salmeterol, Wixela Inhub, Breo Ellipta and Symbicort. Please advise would you like to switch or PA? Thanks, CG

## 2024-07-29 ENCOUNTER — TELEPHONE (OUTPATIENT)
Dept: PRIMARY CARE | Facility: CLINIC | Age: 69
End: 2024-07-29
Payer: MEDICARE

## 2024-07-29 NOTE — TELEPHONE ENCOUNTER
Pt called Rx line asking for a dose pack of prednisone said it was something that has been prescribed proactively in the past and wanted to know if he could get it without an appt? Please advise, AM

## 2024-07-30 NOTE — TELEPHONE ENCOUNTER
Called pt and pt informed of provider message.  Pt states he was wanting it as a preventative in case his back went out while he was out of town.  Pt informed would need OV if having symptoms

## 2024-08-15 DIAGNOSIS — G47.00 INSOMNIA, UNSPECIFIED TYPE: ICD-10-CM

## 2024-08-15 DIAGNOSIS — K21.9 GASTROESOPHAGEAL REFLUX DISEASE WITHOUT ESOPHAGITIS: ICD-10-CM

## 2024-08-15 DIAGNOSIS — R21 RASH: ICD-10-CM

## 2024-08-19 RX ORDER — TRAZODONE HYDROCHLORIDE 100 MG/1
100 TABLET ORAL NIGHTLY PRN
Qty: 90 TABLET | Refills: 3 | OUTPATIENT
Start: 2024-08-19

## 2024-08-19 RX ORDER — OMEPRAZOLE 40 MG/1
40 CAPSULE, DELAYED RELEASE ORAL DAILY
Qty: 90 CAPSULE | Refills: 3 | OUTPATIENT
Start: 2024-08-19

## 2024-08-19 RX ORDER — NYSTATIN AND TRIAMCINOLONE ACETONIDE 100000; 1 [USP'U]/G; MG/G
CREAM TOPICAL
Qty: 60 G | Refills: 11 | OUTPATIENT
Start: 2024-08-19

## 2024-09-24 ENCOUNTER — APPOINTMENT (OUTPATIENT)
Dept: PRIMARY CARE | Facility: CLINIC | Age: 69
End: 2024-09-24
Payer: MEDICARE

## 2024-09-24 ENCOUNTER — LAB (OUTPATIENT)
Dept: LAB | Facility: LAB | Age: 69
End: 2024-09-24
Payer: MEDICARE

## 2024-09-24 VITALS
DIASTOLIC BLOOD PRESSURE: 80 MMHG | BODY MASS INDEX: 31.37 KG/M2 | TEMPERATURE: 97.5 F | WEIGHT: 251 LBS | HEART RATE: 61 BPM | OXYGEN SATURATION: 95 % | SYSTOLIC BLOOD PRESSURE: 124 MMHG

## 2024-09-24 DIAGNOSIS — K76.0 FATTY LIVER: ICD-10-CM

## 2024-09-24 DIAGNOSIS — J45.30 MILD PERSISTENT ASTHMA WITHOUT COMPLICATION (HHS-HCC): ICD-10-CM

## 2024-09-24 DIAGNOSIS — Z23 FLU VACCINE NEED: ICD-10-CM

## 2024-09-24 DIAGNOSIS — K21.9 GASTROESOPHAGEAL REFLUX DISEASE WITHOUT ESOPHAGITIS: ICD-10-CM

## 2024-09-24 DIAGNOSIS — Z12.5 SCREENING FOR MALIGNANT NEOPLASM OF PROSTATE: ICD-10-CM

## 2024-09-24 DIAGNOSIS — N52.9 ERECTILE DYSFUNCTION, UNSPECIFIED ERECTILE DYSFUNCTION TYPE: ICD-10-CM

## 2024-09-24 DIAGNOSIS — M54.50 CHRONIC LOW BACK PAIN WITHOUT SCIATICA, UNSPECIFIED BACK PAIN LATERALITY: ICD-10-CM

## 2024-09-24 DIAGNOSIS — E78.00 PURE HYPERCHOLESTEROLEMIA: Primary | ICD-10-CM

## 2024-09-24 DIAGNOSIS — G47.00 INSOMNIA, UNSPECIFIED TYPE: ICD-10-CM

## 2024-09-24 DIAGNOSIS — B00.1 HERPESVIRAL VESICULAR DERMATITIS: ICD-10-CM

## 2024-09-24 DIAGNOSIS — J30.2 SEASONAL ALLERGIC RHINITIS, UNSPECIFIED TRIGGER: ICD-10-CM

## 2024-09-24 DIAGNOSIS — E78.00 PURE HYPERCHOLESTEROLEMIA: ICD-10-CM

## 2024-09-24 DIAGNOSIS — G89.29 CHRONIC LOW BACK PAIN WITHOUT SCIATICA, UNSPECIFIED BACK PAIN LATERALITY: ICD-10-CM

## 2024-09-24 DIAGNOSIS — I25.10 ATHEROSCLEROSIS OF NATIVE CORONARY ARTERY OF NATIVE HEART WITHOUT ANGINA PECTORIS: ICD-10-CM

## 2024-09-24 DIAGNOSIS — M15.0 PRIMARY GENERALIZED (OSTEO)ARTHRITIS: ICD-10-CM

## 2024-09-24 PROBLEM — M17.11 ARTHRITIS OF RIGHT KNEE: Status: ACTIVE | Noted: 2023-03-15

## 2024-09-24 PROBLEM — R05.3 CHRONIC COUGH: Status: ACTIVE | Noted: 2023-02-03

## 2024-09-24 PROBLEM — M19.011 LOCALIZED OSTEOARTHRITIS OF RIGHT SHOULDER: Status: ACTIVE | Noted: 2020-11-18

## 2024-09-24 PROBLEM — K59.09 CHRONIC CONSTIPATION: Status: ACTIVE | Noted: 2022-04-06

## 2024-09-24 PROBLEM — M25.512 CHRONIC PAIN OF BOTH SHOULDERS: Status: ACTIVE | Noted: 2023-11-12

## 2024-09-24 PROBLEM — H33.21 RETINAL DETACHMENT, RIGHT: Status: ACTIVE | Noted: 2017-12-28

## 2024-09-24 PROBLEM — E66.811 OBESITY, CLASS I, BMI 30-34.9: Status: ACTIVE | Noted: 2022-05-05

## 2024-09-24 PROBLEM — E66.9 OBESITY, CLASS I, BMI 30-34.9: Status: ACTIVE | Noted: 2022-05-05

## 2024-09-24 PROBLEM — M16.12 PRIMARY OSTEOARTHRITIS OF LEFT HIP: Status: ACTIVE | Noted: 2020-11-25

## 2024-09-24 PROBLEM — M19.012 PRIMARY OSTEOARTHRITIS OF LEFT SHOULDER: Status: ACTIVE | Noted: 2020-01-28

## 2024-09-24 PROBLEM — M25.511 CHRONIC PAIN OF BOTH SHOULDERS: Status: ACTIVE | Noted: 2023-11-12

## 2024-09-24 LAB
ALBUMIN SERPL BCP-MCNC: 4.8 G/DL (ref 3.4–5)
ALP SERPL-CCNC: 58 U/L (ref 33–136)
ALT SERPL W P-5'-P-CCNC: 25 U/L (ref 10–52)
ANION GAP SERPL CALC-SCNC: 14 MMOL/L (ref 10–20)
AST SERPL W P-5'-P-CCNC: 23 U/L (ref 9–39)
BILIRUB SERPL-MCNC: 1.4 MG/DL (ref 0–1.2)
BUN SERPL-MCNC: 11 MG/DL (ref 6–23)
CALCIUM SERPL-MCNC: 9.1 MG/DL (ref 8.6–10.3)
CHLORIDE SERPL-SCNC: 102 MMOL/L (ref 98–107)
CHOLEST SERPL-MCNC: 147 MG/DL (ref 0–199)
CHOLESTEROL/HDL RATIO: 2.7
CO2 SERPL-SCNC: 29 MMOL/L (ref 21–32)
CREAT SERPL-MCNC: 0.83 MG/DL (ref 0.5–1.3)
EGFRCR SERPLBLD CKD-EPI 2021: >90 ML/MIN/1.73M*2
GLUCOSE SERPL-MCNC: 84 MG/DL (ref 74–99)
HDLC SERPL-MCNC: 55.2 MG/DL
LDLC SERPL CALC-MCNC: 58 MG/DL
NON HDL CHOLESTEROL: 92 MG/DL (ref 0–149)
POTASSIUM SERPL-SCNC: 4.5 MMOL/L (ref 3.5–5.3)
PROT SERPL-MCNC: 7 G/DL (ref 6.4–8.2)
PSA SERPL-MCNC: 0.83 NG/ML
SODIUM SERPL-SCNC: 140 MMOL/L (ref 136–145)
TRIGL SERPL-MCNC: 170 MG/DL (ref 0–149)
VLDL: 34 MG/DL (ref 0–40)

## 2024-09-24 PROCEDURE — 36415 COLL VENOUS BLD VENIPUNCTURE: CPT

## 2024-09-24 PROCEDURE — G0103 PSA SCREENING: HCPCS

## 2024-09-24 PROCEDURE — G0008 ADMIN INFLUENZA VIRUS VAC: HCPCS | Performed by: PHYSICIAN ASSISTANT

## 2024-09-24 PROCEDURE — 80061 LIPID PANEL: CPT

## 2024-09-24 PROCEDURE — 1159F MED LIST DOCD IN RCRD: CPT | Performed by: PHYSICIAN ASSISTANT

## 2024-09-24 PROCEDURE — 90662 IIV NO PRSV INCREASED AG IM: CPT | Performed by: PHYSICIAN ASSISTANT

## 2024-09-24 PROCEDURE — 1123F ACP DISCUSS/DSCN MKR DOCD: CPT | Performed by: PHYSICIAN ASSISTANT

## 2024-09-24 PROCEDURE — 99214 OFFICE O/P EST MOD 30 MIN: CPT | Performed by: PHYSICIAN ASSISTANT

## 2024-09-24 PROCEDURE — 1036F TOBACCO NON-USER: CPT | Performed by: PHYSICIAN ASSISTANT

## 2024-09-24 PROCEDURE — 80053 COMPREHEN METABOLIC PANEL: CPT

## 2024-09-24 PROCEDURE — 1160F RVW MEDS BY RX/DR IN RCRD: CPT | Performed by: PHYSICIAN ASSISTANT

## 2024-09-24 RX ORDER — ROSUVASTATIN CALCIUM 40 MG/1
40 TABLET, COATED ORAL NIGHTLY
Qty: 90 TABLET | Refills: 1 | Status: SHIPPED | OUTPATIENT
Start: 2024-09-24

## 2024-09-24 RX ORDER — DOXYCYCLINE 50 MG/1
50 CAPSULE ORAL DAILY
COMMUNITY
Start: 2024-08-18

## 2024-09-24 RX ORDER — ALBUTEROL SULFATE 90 UG/1
2 INHALANT RESPIRATORY (INHALATION) EVERY 4 HOURS PRN
Qty: 18 G | Refills: 2 | Status: SHIPPED | OUTPATIENT
Start: 2024-09-24 | End: 2025-09-24

## 2024-09-24 RX ORDER — OMEPRAZOLE 40 MG/1
40 CAPSULE, DELAYED RELEASE ORAL DAILY
Qty: 90 CAPSULE | Refills: 1 | Status: SHIPPED | OUTPATIENT
Start: 2024-09-24

## 2024-09-24 RX ORDER — TRAZODONE HYDROCHLORIDE 100 MG/1
100 TABLET ORAL NIGHTLY PRN
Qty: 90 TABLET | Refills: 1 | Status: SHIPPED | OUTPATIENT
Start: 2024-09-24

## 2024-09-24 RX ORDER — LIFITEGRAST 50 MG/ML
1 SOLUTION/ DROPS OPHTHALMIC 2 TIMES DAILY
COMMUNITY
Start: 2024-07-01

## 2024-09-24 RX ORDER — CELECOXIB 200 MG/1
200-400 CAPSULE ORAL DAILY
Qty: 180 CAPSULE | Refills: 1 | Status: SHIPPED | OUTPATIENT
Start: 2024-09-24

## 2024-09-24 ASSESSMENT — ENCOUNTER SYMPTOMS
ABDOMINAL PAIN: 0
PALPITATIONS: 0
SHORTNESS OF BREATH: 0

## 2024-09-24 NOTE — PROGRESS NOTES
Subjective   Donald Hubbard is a 69 y.o. male and is here for recheck.     HPI:   Presents for recheck of hyperlipidemia, CAD, ED, GERD, asthma, allergies, insomnia, DJD, and cold sores.      Hyperlipidemia: Taking Rosuvastatin 40 mg qd and Zetia. Didn't get labs for this visit -- is fasting.  LDL good at 51 on 3/12/24 labs.    LDL Calculated (mg/dL)   Date Value   2024 51     Triglycerides (mg/dL)   Date Value   2024 102   2023 94   2022 95     HDL-Cholesterol (mg/dL)   Date Value   2024 54.2     HDL (mg/dL)   Date Value   2023 51.4   2022 48.9       CAD: No chest pains. Sees cardiologist Dr العلي.  Had appt there 24. Had increased cardiac calcium score in  but had negative stress test.      ED: well controlled with sildenafil, which he tolerates well.      Cold sores:  Rarely needs to use Valtrex which works well for his cold sore flare ups.    GERD: Taking and tolerating omeprazole 40mg qd. No breakthrough reflux or abdominal pain.     Asthma: Doing well.  Uses Albuterol infrequently.  Due to insurance, switched from Dulera to generic advair but doesn't notice that it makes much difference, so not using either. Wasn't even feeling like he needing the Dulera. Some of his pets  and thinks that has helped his asthma too.     Allergies: Fairly well controlled with Allegra, Flonase, azelastine.        insomnia: Sleeping welll with trazodone -- 1/2 pill usually works adequately.      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  Rarely uses cyclobenzaprine when back flares up.    Has fatty liver. No abdominal pain.  LFTs WNL 23.   ALT (U/L)   Date Value   2024 26     ALT (SGPT) (U/L)   Date Value   2023 29   2022 22     AST (U/L)   Date Value   2024 24   2023 26   2022 21         Sees dermatologist (Dr Buckner) every 6 months.   Sees GI Dr Jose E Moss every 1-2 years.    Cardiologist Dr العلي.   Dr Yolanda Navarro (Ortho for knee)   Dr Jaime Kennedy (ortho shoulder)  Dr Austin (optometry)  Dr Marck Pederson (ophthalmology)  Dr Azul Aguila (ophth)    Colonoscopy done 2022 ([facility]) -- polyp. Repeat in 2025.     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     Going to [place] in a few weeks.     Review of Systems   Respiratory:  Negative for shortness of breath.    Cardiovascular:  Negative for chest pain and palpitations.   Gastrointestinal:  Negative for abdominal pain.      Objective   Physical Exam  HENT:      Head: Normocephalic.   Eyes:      General: No scleral icterus.  Cardiovascular:      Rate and Rhythm: Normal rate and regular rhythm.   Pulmonary:      Effort: Pulmonary effort is normal.      Breath sounds: Normal breath sounds.   Abdominal:      Palpations: Abdomen is soft. There is no mass.      Tenderness: There is no abdominal tenderness.   Skin:     General: Skin is warm and dry.   Neurological:      Mental Status: He is alert.   Psychiatric:         Mood and Affect: Affect normal.       Assessment/Plan   Donald was seen today for asthma, gerd, insomnia, coronary artery disease and hyperlipidemia.  Diagnoses and all orders for this visit:  Pure hypercholesterolemia (Primary)  -     rosuvastatin (Crestor) 40 mg tablet; Take 1 tablet (40 mg) by mouth once daily at bedtime.  -     Comprehensive Metabolic Panel; Future  -     Lipid Panel; Future  Atherosclerosis of native coronary artery of native heart without angina pectoris  Gastroesophageal reflux disease without esophagitis  -     omeprazole (PriLOSEC) 40 mg DR capsule; Take 1 capsule (40 mg) by mouth once daily.  Mild persistent asthma without complication ([facility])  -     albuterol 90 mcg/actuation inhaler; Inhale 2 puffs every 4 hours if needed for wheezing or shortness of breath.  Erectile dysfunction, unspecified erectile dysfunction type  Insomnia, unspecified type  -     traZODone (Desyrel) 100 mg  tablet; Take 1 tablet (100 mg) by mouth as needed at bedtime for sleep.  Fatty liver  -     Comprehensive Metabolic Panel; Future  Seasonal allergic rhinitis, unspecified trigger  Herpesviral vesicular dermatitis  Chronic low back pain without sciatica, unspecified back pain laterality  Flu vaccine need  -     Flu vaccine, trivalent, preservative free, HIGH-DOSE, age 65y+ (Fluzone)  Primary generalized (osteo)arthritis  -     celecoxib (CeleBREX) 200 mg capsule; Take 1-2 capsules (200-400 mg) by mouth once daily.  Screening for malignant neoplasm of prostate  -     Prostate Specific Antigen; Future    Plan    Get fasting labs today.   Discussed diet and exercise, and encouraged weight loss.   Refilled meds.   Flu shot given. Encouraged to get COVID booster and RSV vaccines at pharmacy.   Monitor for any flares of asthma off Dulera or Advair.   Follow up in 6 months for recheck AWE and hyperlipidemia, CAD, GERD, asthma, insomnia, ED with fasting labs the week before.

## 2024-10-29 DIAGNOSIS — B00.1 HERPESVIRAL VESICULAR DERMATITIS: ICD-10-CM

## 2024-10-29 DIAGNOSIS — E78.00 PURE HYPERCHOLESTEROLEMIA: ICD-10-CM

## 2024-11-04 RX ORDER — VALACYCLOVIR HYDROCHLORIDE 1 G/1
1000 TABLET, FILM COATED ORAL 2 TIMES DAILY
Qty: 60 TABLET | Refills: 11 | OUTPATIENT
Start: 2024-11-04

## 2024-11-04 RX ORDER — ROSUVASTATIN CALCIUM 40 MG/1
40 TABLET, COATED ORAL NIGHTLY
Qty: 90 TABLET | Refills: 3 | OUTPATIENT
Start: 2024-11-04

## 2024-11-11 DIAGNOSIS — B00.1 HERPESVIRAL VESICULAR DERMATITIS: ICD-10-CM

## 2024-11-11 RX ORDER — ROSUVASTATIN CALCIUM 40 MG/1
40 TABLET, COATED ORAL NIGHTLY
Qty: 90 TABLET | Refills: 1 | Status: SHIPPED | OUTPATIENT
Start: 2024-11-11

## 2024-11-11 NOTE — TELEPHONE ENCOUNTER
Pt called rx line at 11/8 at 410 requesting refill on pended med.   6 mo was sent 9/24 but it was sent to [facility].  Pt requested refill to [facility] so need to call and verify with pt where rx needs to go. Thanks, CG

## 2024-11-15 ENCOUNTER — TELEPHONE (OUTPATIENT)
Dept: PRIMARY CARE | Facility: CLINIC | Age: 69
End: 2024-11-15
Payer: MEDICARE

## 2024-11-15 NOTE — TELEPHONE ENCOUNTER
Pt called the RX line at 10:01 with questions for [facility] about the RSV vaccine. States [facility] recommended it but pt is concerned about his qualifications for the vaccine due to his health history.  Please advise

## 2024-11-15 NOTE — TELEPHONE ENCOUNTER
Called pt and informed of provider message, he states he just wanted to know if he qualified because [facility] stated they don't give them to anyone under 70 without qualifications. Pt states he will call [facility] and let them know, thanks. AM

## 2025-01-27 NOTE — TELEPHONE ENCOUNTER
LMTCO   Pt called rx line at 1051 am requesting a refill on his Celebrex. Pt states [facility] had his medications messed up and pt stated he has no refills with them. Please advise? Thanks, CG

## 2025-02-17 DIAGNOSIS — B00.1 HERPESVIRAL VESICULAR DERMATITIS: ICD-10-CM

## 2025-02-17 DIAGNOSIS — M15.0 PRIMARY GENERALIZED (OSTEO)ARTHRITIS: ICD-10-CM

## 2025-02-17 DIAGNOSIS — E78.00 PURE HYPERCHOLESTEROLEMIA: ICD-10-CM

## 2025-02-17 RX ORDER — VALACYCLOVIR HYDROCHLORIDE 1 G/1
1000 TABLET, FILM COATED ORAL 2 TIMES DAILY
Qty: 60 TABLET | Refills: 1 | Status: SHIPPED | OUTPATIENT
Start: 2025-02-17

## 2025-02-17 RX ORDER — CELECOXIB 200 MG/1
200-400 CAPSULE ORAL DAILY
Qty: 180 CAPSULE | Refills: 0 | Status: SHIPPED | OUTPATIENT
Start: 2025-02-17

## 2025-02-17 RX ORDER — ROSUVASTATIN CALCIUM 40 MG/1
40 TABLET, COATED ORAL NIGHTLY
Qty: 90 TABLET | Refills: 0 | Status: SHIPPED | OUTPATIENT
Start: 2025-02-17

## 2025-02-17 NOTE — TELEPHONE ENCOUNTER
Portal message from pt,    As of 2025 my new mail order resource is [facility], can you send them my current prescription refills for Celecoxib 200mg /2 per day , Valacyclovir 1000mg /1 per day /as needed, and Rosuvastatin 40mg/ 1 per day. Any questions please contact me via this message option or phone call [phone]. Thanks.     Pt has new Mailorder  Ok to resend?  Please advise. Thanks. JW

## 2025-02-18 ENCOUNTER — TELEPHONE (OUTPATIENT)
Dept: PRIMARY CARE | Facility: CLINIC | Age: 70
End: 2025-02-18
Payer: MEDICARE

## 2025-03-24 ENCOUNTER — APPOINTMENT (OUTPATIENT)
Dept: PRIMARY CARE | Facility: CLINIC | Age: 70
End: 2025-03-24
Payer: MEDICARE

## 2025-03-24 VITALS
DIASTOLIC BLOOD PRESSURE: 82 MMHG | BODY MASS INDEX: 30.76 KG/M2 | TEMPERATURE: 97.8 F | WEIGHT: 252.6 LBS | SYSTOLIC BLOOD PRESSURE: 122 MMHG | OXYGEN SATURATION: 96 % | HEIGHT: 76 IN | HEART RATE: 52 BPM

## 2025-03-24 DIAGNOSIS — Z00.00 ROUTINE GENERAL MEDICAL EXAMINATION AT HEALTH CARE FACILITY: ICD-10-CM

## 2025-03-24 DIAGNOSIS — G89.29 CHRONIC LOW BACK PAIN WITHOUT SCIATICA, UNSPECIFIED BACK PAIN LATERALITY: ICD-10-CM

## 2025-03-24 DIAGNOSIS — K76.0 FATTY LIVER: ICD-10-CM

## 2025-03-24 DIAGNOSIS — B00.1 HERPESVIRAL VESICULAR DERMATITIS: ICD-10-CM

## 2025-03-24 DIAGNOSIS — J30.2 SEASONAL ALLERGIC RHINITIS, UNSPECIFIED TRIGGER: ICD-10-CM

## 2025-03-24 DIAGNOSIS — N52.9 ERECTILE DYSFUNCTION, UNSPECIFIED ERECTILE DYSFUNCTION TYPE: ICD-10-CM

## 2025-03-24 DIAGNOSIS — M15.0 PRIMARY GENERALIZED (OSTEO)ARTHRITIS: ICD-10-CM

## 2025-03-24 DIAGNOSIS — G47.00 INSOMNIA, UNSPECIFIED TYPE: ICD-10-CM

## 2025-03-24 DIAGNOSIS — K21.9 GASTROESOPHAGEAL REFLUX DISEASE WITHOUT ESOPHAGITIS: Primary | ICD-10-CM

## 2025-03-24 DIAGNOSIS — R35.1 NOCTURIA: ICD-10-CM

## 2025-03-24 DIAGNOSIS — J45.30 MILD PERSISTENT ASTHMA WITHOUT COMPLICATION (HHS-HCC): ICD-10-CM

## 2025-03-24 DIAGNOSIS — Z12.5 SCREENING FOR MALIGNANT NEOPLASM OF PROSTATE: ICD-10-CM

## 2025-03-24 DIAGNOSIS — E78.00 PURE HYPERCHOLESTEROLEMIA: ICD-10-CM

## 2025-03-24 DIAGNOSIS — M54.50 CHRONIC LOW BACK PAIN WITHOUT SCIATICA, UNSPECIFIED BACK PAIN LATERALITY: ICD-10-CM

## 2025-03-24 PROCEDURE — 1159F MED LIST DOCD IN RCRD: CPT | Performed by: PHYSICIAN ASSISTANT

## 2025-03-24 PROCEDURE — 1123F ACP DISCUSS/DSCN MKR DOCD: CPT | Performed by: PHYSICIAN ASSISTANT

## 2025-03-24 PROCEDURE — 3008F BODY MASS INDEX DOCD: CPT | Performed by: PHYSICIAN ASSISTANT

## 2025-03-24 PROCEDURE — G0439 PPPS, SUBSEQ VISIT: HCPCS | Performed by: PHYSICIAN ASSISTANT

## 2025-03-24 PROCEDURE — 1158F ADVNC CARE PLAN TLK DOCD: CPT | Performed by: PHYSICIAN ASSISTANT

## 2025-03-24 PROCEDURE — 1160F RVW MEDS BY RX/DR IN RCRD: CPT | Performed by: PHYSICIAN ASSISTANT

## 2025-03-24 PROCEDURE — 99214 OFFICE O/P EST MOD 30 MIN: CPT | Performed by: PHYSICIAN ASSISTANT

## 2025-03-24 PROCEDURE — 1170F FXNL STATUS ASSESSED: CPT | Performed by: PHYSICIAN ASSISTANT

## 2025-03-24 RX ORDER — TAMSULOSIN HYDROCHLORIDE 0.4 MG/1
0.4 CAPSULE ORAL DAILY
Qty: 90 CAPSULE | Refills: 1 | Status: SHIPPED | OUTPATIENT
Start: 2025-03-24

## 2025-03-24 RX ORDER — SILDENAFIL 100 MG/1
50-100 TABLET, FILM COATED ORAL
Qty: 30 TABLET | Refills: 0 | Status: CANCELLED | OUTPATIENT
Start: 2025-03-24

## 2025-03-24 RX ORDER — VALACYCLOVIR HYDROCHLORIDE 1 G/1
1000 TABLET, FILM COATED ORAL 2 TIMES DAILY
Qty: 60 TABLET | Refills: 1 | Status: CANCELLED | OUTPATIENT
Start: 2025-03-24

## 2025-03-24 RX ORDER — FLUTICASONE PROPIONATE 50 MCG
2 SPRAY, SUSPENSION (ML) NASAL DAILY
Qty: 48 G | Refills: 1 | Status: SHIPPED | OUTPATIENT
Start: 2025-03-24

## 2025-03-24 RX ORDER — CELECOXIB 200 MG/1
200-400 CAPSULE ORAL DAILY
Qty: 180 CAPSULE | Refills: 0 | Status: SHIPPED | OUTPATIENT
Start: 2025-03-24

## 2025-03-24 RX ORDER — OMEPRAZOLE 40 MG/1
40 CAPSULE, DELAYED RELEASE ORAL DAILY
Qty: 90 CAPSULE | Refills: 1 | Status: SHIPPED | OUTPATIENT
Start: 2025-03-24

## 2025-03-24 RX ORDER — ROSUVASTATIN CALCIUM 40 MG/1
40 TABLET, COATED ORAL NIGHTLY
Qty: 90 TABLET | Refills: 1 | Status: SHIPPED | OUTPATIENT
Start: 2025-03-24

## 2025-03-24 RX ORDER — TRAZODONE HYDROCHLORIDE 100 MG/1
100 TABLET ORAL NIGHTLY PRN
Qty: 90 TABLET | Refills: 1 | Status: SHIPPED | OUTPATIENT
Start: 2025-03-24

## 2025-03-24 RX ORDER — CYCLOBENZAPRINE HCL 5 MG
5 TABLET ORAL NIGHTLY PRN
Qty: 30 TABLET | Refills: 0 | Status: CANCELLED | OUTPATIENT
Start: 2025-03-24

## 2025-03-24 ASSESSMENT — ACTIVITIES OF DAILY LIVING (ADL)
DRESSING: INDEPENDENT
MANAGING_FINANCES: INDEPENDENT
GROCERY_SHOPPING: INDEPENDENT
DOING_HOUSEWORK: INDEPENDENT
BATHING: INDEPENDENT
TAKING_MEDICATION: INDEPENDENT

## 2025-03-24 ASSESSMENT — PATIENT HEALTH QUESTIONNAIRE - PHQ9
2. FEELING DOWN, DEPRESSED OR HOPELESS: NOT AT ALL
SUM OF ALL RESPONSES TO PHQ9 QUESTIONS 1 AND 2: 0
1. LITTLE INTEREST OR PLEASURE IN DOING THINGS: NOT AT ALL

## 2025-03-24 ASSESSMENT — ENCOUNTER SYMPTOMS
PALPITATIONS: 0
ABDOMINAL PAIN: 0
SHORTNESS OF BREATH: 0

## 2025-03-24 NOTE — PROGRESS NOTES
Subjective   Reason for Visit: Donald Hubbard is an 69 y.o. male here for a Medicare Wellness visit.     Past Medical, Surgical, and Family History reviewed and updated in chart.    Reviewed all medications by prescribing practitioner or clinical pharmacist (such as prescriptions, OTCs, herbal therapies and supplements) and documented in the medical record.    HPI  Annual Wellness visit.     Reviewed patient's answers to all Medicare screening questionnaire questions regarding falls, depression, general health status, nutrition and exercise, functional ability/level of safety, home safety risks, and ADLs/IADLs.     Healthcare POA and Living Will status: yes, his wife Samantha is POA.     Reviewed meds and discussed consistent use of meds as prescribed. Is not taking any high risk controlled/opioid medications.    No bowel or bladder incontinence.      Colonoscopy: 4/6/22 Dr Simone Park -- polyps. Repeat in 2025. Is planning to see Dr Park soon.       The patient has not felt down, felt depressed, felt hopeless, felt socially isolated or had little interest or pleasure in doing things over the past 2 weeks. No trouble with bathing, communicating, using the phone, dressing, eating, preparing meals, grooming, walking, toileting, house work, managing money, medications, shopping or transportation. Reports no falls.  The home does have grab bars in the bathroom. Has handrails on the stairs. The home does not have poor lighting or loose rugs.   Doesn't have hearing difficulty. No diet restrictions. Eats well balanced diet.  No recent hospitalizations.    Feels health is fair.      Significant chronic pain (pain scale 4-5/10) -- arthritis.    Regularly exercises.     No cognitive impairment.     No DM or nephropathy.     Discussed getting yearly flu shots.         Sees dermatologist (Dr Buckner) every 6 months.   Cardiologist Dr العلي.   Dr Yolanda Navarro (Ortho for knee)   Dr Azul Aguila, Dr Sher Zhou (ophth)  Dr Johnson  "Chepe (optometrist)    Patient Care Team:  Rakesh Carson PA-C as PCP - General  Rakesh Carson PA-C as PCP - [facility] [place] [facility] Attributed Provider  Deshaun العلي as Cardiologist (Cardiology)  Jesus Navarro as Consulting Physician (Orthopaedic Surgery)     Review of Systems   Respiratory:  Negative for shortness of breath.    Cardiovascular:  Negative for chest pain and palpitations.   Gastrointestinal:  Negative for abdominal pain.       Objective   Vitals:  /82   Pulse 52   Temp 36.6 °C (97.8 °F)   Ht 1.918 m (6' 3.5\")   Wt 115 kg (252 lb 9.6 oz)   SpO2 96%   BMI 31.16 kg/m²       Physical Exam  Vitals and nursing note reviewed.   HENT:      Head: Normocephalic.   Eyes:      General: No scleral icterus.  Cardiovascular:      Rate and Rhythm: Normal rate and regular rhythm.   Pulmonary:      Effort: Pulmonary effort is normal.      Breath sounds: Normal breath sounds.   Abdominal:      Palpations: Abdomen is soft. There is no mass.      Tenderness: There is no abdominal tenderness.   Skin:     General: Skin is warm and dry.   Neurological:      Mental Status: He is alert.   Psychiatric:         Mood and Affect: Affect normal.         Assessment & Plan  Pure hypercholesterolemia         Fatty liver         Gastroesophageal reflux disease without esophagitis         Mild persistent asthma without complication (HHS-HCC)         Erectile dysfunction, unspecified erectile dysfunction type         Insomnia, unspecified type         Seasonal allergic rhinitis, unspecified trigger         Herpesviral vesicular dermatitis         Primary generalized (osteo)arthritis         Chronic low back pain without sciatica, unspecified back pain laterality         Routine general medical examination at health care facility                   Discussed wellness and safety issues.   Discussed diet and exercise.   Recommend flu shot every fall.   Advised to get me a copy of his healthcare POA/LW documents.   Advised to schedule " colonoscopy this year.

## 2025-03-24 NOTE — PROGRESS NOTES
Subjective   Donald Hubbard is a 69 y.o. male and is here for recheck of Medicare Annual Wellness Visit Subsequent (AWE) and Hyperlipidemia (recheck)         HPI:   Presents for recheck of hyperlipidemia, CAD, ED, GERD, asthma, allergies, insomnia, DJD, and cold sores.  And AWE.     Hyperlipidemia: Taking Rosuvastatin 40 mg qd and Zetia. Didn't get labs for this visit -- is fasting.  LDL good at 58 on 24 labs.    LDL Calculated (mg/dL)   Date Value   2024 58   2024 51     LDL (mg/dL)   Date Value   2023 49   2022 67     Triglycerides (mg/dL)   Date Value   2024 170 (H)   2024 102     HDL-Cholesterol (mg/dL)   Date Value   2024 55.2   2024 54.2       CAD: Sees cardiologist Dr العلي.  Has appt there 25. Had increased cardiac calcium score in  but had negative stress test. No chest pains.       ED: well controlled with sildenafil, which he tolerates well.      Cold sores:  Rarely needs to use Valtrex which works well for his cold sore flare ups.    GERD: Controlled. Taking and tolerating omeprazole 40mg qd. No breakthrough reflux or abdominal pain.     Asthma: Doing well.  Uses Albuterol infrequently.  Hasn't needed to use his advair the past year since some pets  that he thinks were affecting his asthma.     Allergies: Fairly well controlled with Allegra, Flonase, azelastine.        insomnia: Sleeping welll with trazodone -- 1/2 pill usually works adequately.      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 Tolerates well.  Rarely uses cyclobenzaprine when back flares up.    Has fatty liver. No abdominal pain.  LFTs WNL 24.   ALT (U/L)   Date Value   2024 25   2024 26     AST (U/L)   Date Value   2024 23   2024 24       Slowly getting more nocturia the past 2 years. Used to have nocturia once per night but slowly increased to twice per night the past year.   Lab Results   Component Value  Date    PSASCREEN 0.55 09/12/2023    PSA 0.83 09/24/2024    PSA 0.54 10/05/2021         Sees dermatologist (Dr Buckner) every 6 months.   Cardiologist Dr العلي.   Dr Yolanda Navarro (Ortho for knee)   Dr Azul Aguila, Dr Sher Zhou (ophth)  Dr Elizabeth Mo (optometrist)    Colonoscopy done 2022 ([facility]) -- polyp. Repeat in 2025. Is planning to see Dr Simone chaudhary.     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           Review of Systems   Respiratory:  Negative for shortness of breath.    Cardiovascular:  Negative for chest pain and palpitations.   Gastrointestinal:  Negative for abdominal pain.      Objective   Physical Exam  HENT:      Head: Normocephalic.   Eyes:      General: No scleral icterus.  Cardiovascular:      Rate and Rhythm: Normal rate and regular rhythm.   Pulmonary:      Effort: Pulmonary effort is normal.      Breath sounds: Normal breath sounds.   Abdominal:      Palpations: Abdomen is soft. There is no mass.      Tenderness: There is no abdominal tenderness.   Skin:     General: Skin is warm and dry.   Neurological:      Mental Status: He is alert.   Psychiatric:         Mood and Affect: Affect normal.       Assessment/Plan   Donald was seen today for medicare annual wellness visit subsequent and hyperlipidemia.  Diagnoses and all orders for this visit:  Gastroesophageal reflux disease without esophagitis (Primary)  -     omeprazole (PriLOSEC) 40 mg DR capsule; Take 1 capsule (40 mg) by mouth once daily.  -     Follow Up In Primary Care - Established; Future  Pure hypercholesterolemia  -     Lipid Panel; Future  -     Comprehensive Metabolic Panel; Future  -     rosuvastatin (Crestor) 40 mg tablet; Take 1 tablet (40 mg) by mouth once daily at bedtime.  -     Follow Up In Primary Care - Established; Future  -     Lipid Panel  -     Comprehensive Metabolic Panel  Fatty liver  -     Comprehensive Metabolic Panel; Future  -     Comprehensive Metabolic Panel  Mild persistent asthma  without complication ([facility])  -     Follow Up In Primary Care - Established; Future  Erectile dysfunction, unspecified erectile dysfunction type  -     Follow Up In Primary Care - Established; Future  Insomnia, unspecified type  -     traZODone (Desyrel) 100 mg tablet; Take 1 tablet (100 mg) by mouth as needed at bedtime for sleep.  -     Follow Up In Primary Care - Established; Future  Seasonal allergic rhinitis, unspecified trigger  -     fluticasone (Flonase) 50 mcg/actuation nasal spray; Administer 2 sprays into each nostril once daily.  Herpesviral vesicular dermatitis  Primary generalized (osteo)arthritis  -     celecoxib (CeleBREX) 200 mg capsule; Take 1-2 capsules (200-400 mg) by mouth once daily.  Chronic low back pain without sciatica, unspecified back pain laterality  Routine general medical examination at health care facility  Nocturia  -     tamsulosin (Flomax) 0.4 mg 24 hr capsule; Take 1 capsule (0.4 mg) by mouth once daily.      Plan    Get fasting labs soon.   Discussed diet and exercise, and encouraged weight loss.   Refilled meds.   Start Flomax for nocturia (though advised to ensure with his eye Dr that it is ok to take this med).   Monitor for any flares of asthma off Advair.   Follow up in 6 months for recheck hyperlipidemia, CAD, GERD, asthma, insomnia, ED with fasting labs the week before.

## 2025-03-25 LAB
ALBUMIN SERPL-MCNC: 4.8 G/DL (ref 3.6–5.1)
ALP SERPL-CCNC: 52 U/L (ref 35–144)
ALT SERPL-CCNC: 22 U/L (ref 9–46)
ANION GAP SERPL CALCULATED.4IONS-SCNC: 10 MMOL/L (CALC) (ref 7–17)
AST SERPL-CCNC: 21 U/L (ref 10–35)
BILIRUB SERPL-MCNC: 1.3 MG/DL (ref 0.2–1.2)
BUN SERPL-MCNC: 10 MG/DL (ref 7–25)
CALCIUM SERPL-MCNC: 9.2 MG/DL (ref 8.6–10.3)
CHLORIDE SERPL-SCNC: 103 MMOL/L (ref 98–110)
CHOLEST SERPL-MCNC: 132 MG/DL
CHOLEST/HDLC SERPL: 2.4 (CALC)
CO2 SERPL-SCNC: 27 MMOL/L (ref 20–32)
CREAT SERPL-MCNC: 0.87 MG/DL (ref 0.7–1.35)
EGFRCR SERPLBLD CKD-EPI 2021: 93 ML/MIN/1.73M2
GLUCOSE SERPL-MCNC: 87 MG/DL (ref 65–99)
HDLC SERPL-MCNC: 54 MG/DL
LDLC SERPL CALC-MCNC: 57 MG/DL (CALC)
NONHDLC SERPL-MCNC: 78 MG/DL (CALC)
POTASSIUM SERPL-SCNC: 4.2 MMOL/L (ref 3.5–5.3)
PROT SERPL-MCNC: 6.8 G/DL (ref 6.1–8.1)
SODIUM SERPL-SCNC: 140 MMOL/L (ref 135–146)
TRIGL SERPL-MCNC: 125 MG/DL

## 2025-03-30 ASSESSMENT — ENCOUNTER SYMPTOMS
PALPITATIONS: 0
SHORTNESS OF BREATH: 0
ABDOMINAL PAIN: 0

## 2025-07-05 DIAGNOSIS — M15.0 PRIMARY GENERALIZED (OSTEO)ARTHRITIS: ICD-10-CM

## 2025-07-07 RX ORDER — CELECOXIB 200 MG/1
CAPSULE ORAL
Qty: 180 CAPSULE | Refills: 3 | Status: SHIPPED | OUTPATIENT
Start: 2025-07-07

## 2025-07-14 DIAGNOSIS — B00.1 HERPESVIRAL VESICULAR DERMATITIS: ICD-10-CM

## 2025-07-16 RX ORDER — VALACYCLOVIR HYDROCHLORIDE 1 G/1
TABLET, FILM COATED ORAL
Qty: 60 TABLET | Refills: 3 | OUTPATIENT
Start: 2025-07-16

## 2025-07-20 DIAGNOSIS — K21.9 GASTROESOPHAGEAL REFLUX DISEASE WITHOUT ESOPHAGITIS: ICD-10-CM

## 2025-07-20 DIAGNOSIS — G47.00 INSOMNIA, UNSPECIFIED TYPE: ICD-10-CM

## 2025-07-24 RX ORDER — OMEPRAZOLE 40 MG/1
40 CAPSULE, DELAYED RELEASE ORAL DAILY
Qty: 90 CAPSULE | Refills: 3 | OUTPATIENT
Start: 2025-07-24

## 2025-07-24 RX ORDER — TRAZODONE HYDROCHLORIDE 100 MG/1
100 TABLET ORAL NIGHTLY PRN
Qty: 90 TABLET | Refills: 3 | OUTPATIENT
Start: 2025-07-24

## 2025-07-29 DIAGNOSIS — B00.1 HERPESVIRAL VESICULAR DERMATITIS: ICD-10-CM

## 2025-08-01 DIAGNOSIS — K21.9 GASTROESOPHAGEAL REFLUX DISEASE WITHOUT ESOPHAGITIS: ICD-10-CM

## 2025-08-01 DIAGNOSIS — B00.1 HERPESVIRAL VESICULAR DERMATITIS: ICD-10-CM

## 2025-08-01 RX ORDER — OMEPRAZOLE 40 MG/1
40 CAPSULE, DELAYED RELEASE ORAL DAILY
Qty: 90 CAPSULE | Refills: 0 | Status: SHIPPED | OUTPATIENT
Start: 2025-08-01

## 2025-08-01 RX ORDER — VALACYCLOVIR HYDROCHLORIDE 1 G/1
TABLET, FILM COATED ORAL
Qty: 60 TABLET | Refills: 3 | OUTPATIENT
Start: 2025-08-01

## 2025-08-01 RX ORDER — VALACYCLOVIR HYDROCHLORIDE 1 G/1
1000 TABLET, FILM COATED ORAL 2 TIMES DAILY
Qty: 60 TABLET | Refills: 0 | Status: SHIPPED | OUTPATIENT
Start: 2025-08-01

## 2025-08-01 NOTE — TELEPHONE ENCOUNTER
Rx were declined so pt refills seem to be cancelled.  OK to resend RX to [facility]?   Pt next OV is 9/23/25.  Thanks, CG

## 2025-09-23 ENCOUNTER — APPOINTMENT (OUTPATIENT)
Dept: PRIMARY CARE | Facility: CLINIC | Age: 70
End: 2025-09-23
Payer: MEDICARE